# Patient Record
Sex: FEMALE | Race: WHITE | Employment: OTHER | URBAN - METROPOLITAN AREA
[De-identification: names, ages, dates, MRNs, and addresses within clinical notes are randomized per-mention and may not be internally consistent; named-entity substitution may affect disease eponyms.]

---

## 2017-01-06 ENCOUNTER — HOSPITAL ENCOUNTER (OUTPATIENT)
Dept: PHYSICAL THERAPY | Age: 72
Discharge: HOME | End: 2017-01-06
Payer: MEDICARE

## 2017-01-06 PROCEDURE — G8978 MOBILITY CURRENT STATUS: HCPCS

## 2017-01-06 PROCEDURE — G8979 MOBILITY GOAL STATUS: HCPCS

## 2017-01-06 PROCEDURE — 97162 PT EVAL MOD COMPLEX 30 MIN: CPT

## 2017-01-06 NOTE — PROGRESS NOTES
[facility] at [facility], [address]  Phone: [phone]   Fax: [phone]    Plan of Care/ Statement of Necessity for Physical Therapy Services    Patient name: Trice Page Start of Care: 2017   Referral source: Mendy Jacome MD : 1945    Medical Diagnosis: Pain in left knee [M25.562]  Pain in right knee [M25.561]   Onset Date:2016    Treatment Diagnosis: Bilateral knee pain, worse on right   Prior Hospitalization: see medical history Provider#: [id number]   Medications: Verified on Patient summary List    Comorbidities: depression, osteoporosis, arthritis, thyroid problems, high blood pressure, hearing impaired, hx breast cancer, right mastectomy, right breast reconstruction, left meniscus repair, hysterectomy, gastric bypass   Prior Level of Function: less difficulty walking and climbing stairs      The Plan of Care and the following information is based on the information from the initial evaluation. Physical Therapy Evaluation - Knee    The pt reports that she has been having bilateral knee pain, with the right knee worse, and she told her primary care doctor about it. Her doctor monitored her pain which kept getting worse so she went to see an orthopedic doctor who told her she has a torn meniscus in her right knee and told he just recommended that she wear a brace and use a cane. She injured her right knee in October when she fell when negotiating a curb and landing straight on her right knee. She went to get a second opinion and is now getting injections for her right knee. She is most likely going to have a total knee replacement in April or May. For now she has been referred for aquatic therapy to work on strengthening and pain control. The pt rates her current pain a 2/10.  She reports that her pain can be a 10/10 at worst and a 0/10 at best.  Functional limitations: prolonged standing increases her pain, cleaning her house is difficult and increases her pain, she also has pain when she goes to stand up after sitting for a while, she has pain and difficulty climbing stairs, she has difficulty walking and has had to stop walking for exrecise  The pt scored a 42 out of 100 on FOTO today. The pt is retired and lives in a two story house with her bedroom upstairs. Gait:  [] Normal    [x] Abnormal    [x] Antalgic    [] NWB    Device:    Describe: decreased stance time on right, decreased trunk rotation    ROM / Strength  [] Unable to assess                  AROM                Strength (1-5)    Right Left Right Left   Hip Flexion   4-/p! 5    Glute max  (seated)   5 5    Abduction   (seated)   4/p! 5    Adduction  (seated)   4/p! 5   Knee Flexion  (seated) 124/p! 135 4 5    Extension 2 2 4-/p! 5   Ankle Plantarflexion  (seated)   4/p! Dorsiflexion   5 5       Flexibility: [] Unable to assess at this time  Hamstrings:    (Right) Tightness= [x] WNL   [] Min   [] Mod   [] Severe    (Left) Tightness= [x] WNL   [] Min   [] Mod   [] Severe  Gastroc:      (Right) Tightness= [x] WNL   [] Min   [] Mod   [] Severe    (Left) Tightness= [x] WNL   [] Min   [] Mod   [] Severe    Palpation: the most pain/tenderness over medial joint line, slight tenderness over patellar tendon    Patella findings: mobility is WNL in all direction bilaterally, pain with superior glides on right    Assessment/ key information: 81 yo female presents with bilateral knee pain, right greater than left. She was recently diagnosed with a right knee meniscus tear and was told she has arthritis in both knees. She has such advanced arthritis in the right knee that she will most likely have a TKR in a few months. She has some LE weakness and would benefit from aquatic therapy to improve her strength and movement control to manage her pain and ability to perform ADLs until she has surgery.     Evaluation Complexity History MEDIUM  Complexity : 1-2 comorbidities / personal factors will impact the outcome/ POC ; Examination HIGH Complexity : 4+ Standardized tests and measures addressing body structure, function, activity limitation and / or participation in recreation  ;Presentation MEDIUM Complexity : Evolving with changing characteristics  ; Clinical Decision Making MEDIUM Complexity : FOTO score of 26-74  Overall Complexity Rating: MEDIUM  Problem List: pain affecting function, decrease ROM, decrease strength, impaired gait/ balance, decrease ADL/ functional abilitiies, decrease activity tolerance and decrease transfer abilities   Treatment Plan may include any combination of the following: Therapeutic exercise, Therapeutic activities, Neuromuscular re-education, Physical agent/modality, Gait/balance training, Manual therapy, Aquatic therapy, Patient education and Functional mobility training  Patient / Family readiness to learn indicated by: asking questions, trying to perform skills and interest  Persons(s) to be included in education: patient (P)  Barriers to Learning/Limitations: yes;  sensory deficits-vision/hearing/speech  Patient Goal (s): to get my leg and knee strong again  Patient Self Reported Health Status: good  Rehabilitation Potential: good    Short Term Goals: To be accomplished in 4 treatments:   1) Seated right knee flexion strength will increase to 5/5 to improve her tolerance to household cleaning. Status at eval: 4/5    Long Term Goals: To be accomplished in 8 treatments:   1) The pt will score at least a 55 on FOTO to demonstrate improved functional mobility for ADLs. Status at eval: 42     2) Right knee ext strength will increase to 4+/5 her tolerance to moving from sit to stand. Status at eval: 4-/5 with pain     3) Right hip flexion strength will increase to 4+/5 to improve her ability to climb stairs. Status at eval:4-/5 with pain    Frequency / Duration: Patient to be seen 2 times per week for 4 weeks.     Patient/ Caregiver education and instruction: Diagnosis, prognosis,    [x]  Plan of care has been reviewed with PTA    G-Codes (GP)  Mobility   Current  CK= 40-59%   Goal  CK= 40-59%    The severity rating is based on clinical judgment and the FOTO score. Certification Period: 1/6/2017 to 2/4/2017  Jorge Luis Webb, PT 1/6/2017 10:15 AM  _____________________________________________________________________  I certify that the above Therapy Services are being furnished while the patient is under my care. I agree with the treatment plan and certify that this therapy is necessary.     Physician's Signature:____________________  Date:__________Time:______    Please sign and return to   [facility] at [address] [place] [address]  Phone: [phone]   Fax: [phone]

## 2017-01-06 NOTE — PROGRESS NOTES
PT DAILY TREATMENT NOTE - [facility]     Patient Name: Suma Holland  Date:2017  : 1945  [x]  Patient  Verified  Payor: [place] MEDICARE / Plan: [place] MEDICARE PART A & B / Product Type: Medicare /    In time:10:28  Out time:10:57  Total Treatment Time (min): 29  Total Timed Codes (min): 0  1:1 Treatment Time ( [address] only): 29   Visit #: 1 of 8    Treatment Area: Pain in left knee [M25.562]  Pain in right knee [M25.561]    SUBJECTIVE  Pain Level (0-10 scale): 2/10  Any medication changes, allergies to medications, adverse drug reactions, diagnosis change, or new procedure performed?: [x] No    [] Yes (see summary sheet for update)  Subjective functional status/changes:   [] No changes reported  See eval    OBJECTIVE    Modality rationale:    Min Type Additional Details    [] Estim:  []Unatt       []IFC  []Premod                        []Other:  []w/ice   []w/heat  Position:  Location:    [] Estim: []Att    []TENS instruct  []NMES                    []Other:  []w/US   []w/ice   []w/heat  Position:  Location:    []  Traction: [] Cervical       []Lumbar                       [] Prone          []Supine                       []Intermittent   []Continuous Lbs:  [] before manual  [] after manual    []  Ultrasound: []Continuous   [] Pulsed                           []1MHz   []3MHz W/cm2:  Location:    []  Iontophoresis with dexamethasone         Location: [] Take home patch   [] In clinic    []  Ice     []  heat  []  Ice massage  []  Laser   []  Anodyne Position:  Location:    []  Laser with stim  []  Other:  Position:  Location:    []  Vasopneumatic Device Pressure:       [] lo [] med [] hi   Temperature: [] lo [] med [] hi   [] Skin assessment post-treatment:  []intact []redness- no adverse reaction    []redness  adverse reaction:     29 min [x]Eval                  []Re-Eval        min Therapeutic Exercise:  [] See flow sheet :   Rationale:      min Therapeutic Activity:  []  See flow sheet :   Rationale:       min Neuromuscular Re-education:  []  See flow sheet :   Rationale:      min Manual Therapy:     Rationale:      min Gait Training:  ___ feet with ___ device on level surfaces with ___ level of assist   Rationale: With   [] TE   [] TA   [] neuro   [] other: Patient Education: [] Review HEP    [] Progressed/Changed HEP based on:   [] positioning   [] body mechanics   [] transfers   [] heat/ice application    [] other:      Other Objective/Functional Measures: see eval     Pain Level (0-10 scale) post treatment: 5-6/10    ASSESSMENT/Changes in Function: see POC    Patient will continue to benefit from skilled PT services to modify and progress therapeutic interventions, address functional mobility deficits, address ROM deficits, address strength deficits, analyze and cue movement patterns, analyze and modify body mechanics/ergonomics, assess and modify postural abnormalities and instruct in home and community integration to attain remaining goals. [x]  See Plan of Care  []  See progress note/recertification  []  See Discharge Summary           PLAN  []  Upgrade activities as tolerated     [x]  Continue plan of care  []  Update interventions per flow sheet       []  Discharge due to:_  []  Other:_      Khadar Igancio, PT 1/6/2017  11:12 AM    No future appointments.

## 2017-01-11 ENCOUNTER — HOSPITAL ENCOUNTER (OUTPATIENT)
Dept: PHYSICAL THERAPY | Age: 72
Discharge: HOME | End: 2017-01-11
Payer: MEDICARE

## 2017-01-11 PROCEDURE — 97113 AQUATIC THERAPY/EXERCISES: CPT

## 2017-01-11 NOTE — PROGRESS NOTES
PT DAILY TREATMENT NOTE - [facility]     Patient Name: Vinnie Duncan  Date:2017  : 1945  [x]  Patient  Verified  Payor: Linder Cheadle / Plan: [place] MEDICARE PART A & B / Product Type: Medicare /    In time:1338  Out time:1408  Total Treatment Time (min): 30  Total Timed Codes (min): 30  1:1 Treatment Time ( only): 30   Visit #: 2 of 8    Treatment Area: Pain in left knee [M25.562]  Pain in right knee [M25.561]    SUBJECTIVE  Pain Level (0-10 scale): 2/10 left knee only  Any medication changes, allergies to medications, adverse drug reactions, diagnosis change, or new procedure performed?: [x] No    [] Yes (see summary sheet for update)  Subjective functional status/changes:   [] No changes reported  I got an injection in my right knee and have no pain. Reviewed eval prior to tx.   OBJECTIVE    Modality rationale:    Min Type Additional Details    [] Estim:  []Unatt       []IFC  []Premod                        []Other:  []w/ice   []w/heat  Position:  Location:    [] Estim: []Att    []TENS instruct  []NMES                    []Other:  []w/US   []w/ice   []w/heat  Position:  Location:    []  Traction: [] Cervical       []Lumbar                       [] Prone          []Supine                       []Intermittent   []Continuous Lbs:  [] before manual  [] after manual    []  Ultrasound: []Continuous   [] Pulsed                           []1MHz   []3MHz W/cm2:  Location:    []  Iontophoresis with dexamethasone         Location: [] Take home patch   [] In clinic    []  Ice     []  heat  []  Ice massage  []  Laser   []  Anodyne Position:  Location:    []  Laser with stim  []  Other:  Position:  Location:    []  Vasopneumatic Device Pressure:       [] lo [] med [] hi   Temperature: [] lo [] med [] hi   [] Skin assessment post-treatment:  []intact []redness- no adverse reaction    []redness  adverse reaction:      min []Eval                  []Re-Eval       30 min Therapeutic Exercise:  [x] See flow sheet :aquatic therex per f/s   Rationale: increase ROM and increase strength to improve the patients ability to improve ADL's     min Therapeutic Activity:  []  See flow sheet :         min Neuromuscular Re-education:  []  See flow sheet :        min Manual Therapy:          min Gait Training:  ___ feet with ___ device on level surfaces with ___ level of assist   Rationale: With   [] TE   [] TA   [] neuro   [] other: Patient Education: [x] Review HEP    [] Progressed/Changed HEP based on:   [] positioning   [] body mechanics   [] transfers   [] heat/ice application    [] other:      Other Objective/Functional Measures:      Pain Level (0-10 scale) post treatment: 3-4/10    ASSESSMENT/Changes in Function: Pt tolerated all aquatic exercises well but did report increased B knee pain when finished and out of the pool. Patient will continue to benefit from skilled PT services to modify and progress therapeutic interventions, address functional mobility deficits, address ROM deficits, address strength deficits, analyze and address soft tissue restrictions, analyze and cue movement patterns, analyze and modify body mechanics/ergonomics, assess and modify postural abnormalities, address imbalance/dizziness and instruct in home and community integration to attain remaining goals. [x]  See Plan of Care  []  See progress note/recertification  []  See Discharge Summary         Progress towards goals / Updated goals:  Short Term Goals: To be accomplished in 4 treatments:  1) Seated right knee flexion strength will increase to 5/5 to improve her tolerance to household cleaning. Status at eval: 4/5  Current: no change     Long Term Goals: To be accomplished in 8 treatments:  1) The pt will score at least a 55 on FOTO to demonstrate improved functional mobility for ADLs.   Status at eval: 42  Current: retest every 5 visits     2) Right knee ext strength will increase to 4+/5 her tolerance to moving from sit to stand.  Status at eval: 4-/5 with pain  Current: no change     3) Right hip flexion strength will increase to 4+/5 to improve her ability to climb stairs. Status at eval:4-/5 with pain  Current: no change    PLAN  []  Upgrade activities as tolerated     [x]  Continue plan of care  []  Update interventions per flow sheet       []  Discharge due to:_  [x]  Other:_assess response to first visit.       Ailyn Cid PTA 1/11/2017  3:35 PM    Future Appointments  Date Time Provider Aquilino Tirado   1/13/2017 1:30 PM Jerral Punches, PTA [facility]   1/16/2017 1:30 PM Jerral Punches, PTA [facility]   1/20/2017 1:30 PM Jerral Punches, PTA [facility]   1/23/2017 1:30 PM Jerral Punches, PTA [facility]   1/27/2017 1:30 PM Jerral Punches, PTA [facility]   1/30/2017 1:30 PM Kayode Webb, PT [facility]   2/3/2017 1:30 PM David Blanco, PT [facility]

## 2017-01-13 ENCOUNTER — APPOINTMENT (OUTPATIENT)
Dept: PHYSICAL THERAPY | Age: 72
End: 2017-01-13
Payer: MEDICARE

## 2017-01-16 ENCOUNTER — HOSPITAL ENCOUNTER (OUTPATIENT)
Dept: PHYSICAL THERAPY | Age: 72
Discharge: HOME | End: 2017-01-16
Payer: MEDICARE

## 2017-01-16 PROCEDURE — 97113 AQUATIC THERAPY/EXERCISES: CPT

## 2017-01-16 NOTE — PROGRESS NOTES
PT DAILY TREATMENT NOTE - [facility]     Patient Name: Nitin Oden  Date:2017  : 1945  [x]  Patient  Verified  Payor: [place] MEDICARE / Plan: [place] MEDICARE PART A & B / Product Type: Medicare /    In time:1:25  Out time:1:54  Total Treatment Time (min): 29  Total Timed Codes (min): 29  1:1 Treatment Time ([facility] only): 25   Visit #: 3 of 8    Treatment Area: Pain in left knee [M25.562]  Pain in right knee [M25.561]    SUBJECTIVE  Pain Level (0-10 scale): 0/10 sore  Any medication changes, allergies to medications, adverse drug reactions, diagnosis change, or new procedure performed?: [x] No    [] Yes (see summary sheet for update)  Subjective functional status/changes:   [] No changes reported  The pt reports that she was sore after her last session and thinks she over did it because she was doing more reps than she was told because she would realize she was doing the exercise incorrectly.     OBJECTIVE    Modality rationale:    Min Type Additional Details    [] Estim:  []Unatt       []IFC  []Premod                        []Other:  []w/ice   []w/heat  Position:  Location:    [] Estim: []Att    []TENS instruct  []NMES                    []Other:  []w/US   []w/ice   []w/heat  Position:  Location:    []  Traction: [] Cervical       []Lumbar                       [] Prone          []Supine                       []Intermittent   []Continuous Lbs:  [] before manual  [] after manual    []  Ultrasound: []Continuous   [] Pulsed                           []1MHz   []3MHz W/cm2:  Location:    []  Iontophoresis with dexamethasone         Location: [] Take home patch   [] In clinic    []  Ice     []  heat  []  Ice massage  []  Laser   []  Anodyne Position:  Location:    []  Laser with stim  []  Other:  Position:  Location:    []  Vasopneumatic Device Pressure:       [] lo [] med [] hi   Temperature: [] lo [] med [] hi   [] Skin assessment post-treatment:  []intact []redness- no adverse reaction    []redness  adverse reaction:      min []Eval                  []Re-Eval       29 min Therapeutic Exercise:  [x] See flow sheet : aquatics   Rationale: increase ROM and increase strength to improve the patients ability to walk     min Therapeutic Activity:  []  See flow sheet :   Rationale:       min Neuromuscular Re-education:  []  See flow sheet :   Rationale:      min Manual Therapy:     Rationale:      min Gait Training:  ___ feet with ___ device on level surfaces with ___ level of assist   Rationale: With   [] TE   [] TA   [] neuro   [] other: Patient Education: [x] Review HEP    [] Progressed/Changed HEP based on:   [] positioning   [] body mechanics   [] transfers   [] heat/ice application    [] other:      Other Objective/Functional Measures:      Pain Level (0-10 scale) post treatment: 4/10    ASSESSMENT/Changes in Function: The pt had increased pain after exercises and stated that her knee was sore from all the activity. She might not be able to be progressed for a while until she gets stronger and has better exercise tolerance. Patient will continue to benefit from skilled PT services to modify and progress therapeutic interventions, address functional mobility deficits, address ROM deficits, address strength deficits, analyze and address soft tissue restrictions, analyze and cue movement patterns, analyze and modify body mechanics/ergonomics, assess and modify postural abnormalities, address imbalance/dizziness and instruct in home and community integration to attain remaining goals.      [x]  See Plan of Care  []  See progress note/recertification  []  See Discharge Summary           PLAN  [x]  Upgrade activities as tolerated     [x]  Continue plan of care  []  Update interventions per flow sheet       []  Discharge due to:_  []  Other:_      Razia Denton, PT 1/16/2017  4:37 PM    Future Appointments  Date Time Provider Aquilino Tirado   1/20/2017 1:30 PM Dianna Mike, PTA [facility]   1/23/2017 1:30 PM Alycia Arreguin, PT [facility]   1/27/2017 1:30 PM Dianna Handler, PTA [facility]   1/30/2017 1:30 PM Alycia Arreguin, PT [facility]   2/3/2017 1:30 PM Alycia Arreguin, PT [facility]

## 2017-01-20 ENCOUNTER — HOSPITAL ENCOUNTER (OUTPATIENT)
Dept: PHYSICAL THERAPY | Age: 72
Discharge: HOME | End: 2017-01-20
Payer: MEDICARE

## 2017-01-20 PROCEDURE — 97113 AQUATIC THERAPY/EXERCISES: CPT

## 2017-01-20 NOTE — PROGRESS NOTES
PT DAILY TREATMENT NOTE - [facility]     Patient Name: Bhavya Veras  Date:2017  : 1945  [x]  Patient  Verified  Payor: Almita Riberio / Plan: [place] MEDICARE PART A & B / Product Type: Medicare /    In time:1:30  Out time:2:00  Total Treatment Time (min): 30  Total Timed Codes (min): 30  1:1 Treatment Time ( only): 25   Visit #: 4 of 8    Treatment Area: Pain in left knee [M25.562]  Pain in right knee [M25.561]    SUBJECTIVE  Pain Level (0-10 scale): 2/10  Any medication changes, allergies to medications, adverse drug reactions, diagnosis change, or new procedure performed?: [x] No    [] Yes (see summary sheet for update)  Subjective functional status/changes:   [x] No changes reported      OBJECTIVE    Modality rationale:    Min Type Additional Details    [] Estim:  []Unatt       []IFC  []Premod                        []Other:  []w/ice   []w/heat  Position:  Location:    [] Estim: []Att    []TENS instruct  []NMES                    []Other:  []w/US   []w/ice   []w/heat  Position:  Location:    []  Traction: [] Cervical       []Lumbar                       [] Prone          []Supine                       []Intermittent   []Continuous Lbs:  [] before manual  [] after manual    []  Ultrasound: []Continuous   [] Pulsed                           []1MHz   []3MHz W/cm2:  Location:    []  Iontophoresis with dexamethasone         Location: [] Take home patch   [] In clinic    []  Ice     []  heat  []  Ice massage  []  Laser   []  Anodyne Position:  Location:    []  Laser with stim  []  Other:  Position:  Location:    []  Vasopneumatic Device Pressure:       [] lo [] med [] hi   Temperature: [] lo [] med [] hi   [] Skin assessment post-treatment:  []intact []redness- no adverse reaction    []redness  adverse reaction:      min []Eval                  []Re-Eval       30 min Therapeutic Exercise:  [x] See flow sheet : aquatics   Rationale: increase ROM and increase strength to improve the patients ability to climb stairs     min Therapeutic Activity:  []  See flow sheet :   Rationale:       min Neuromuscular Re-education:  []  See flow sheet :   Rationale:      min Manual Therapy:     Rationale:      min Gait Training:  ___ feet with ___ device on level surfaces with ___ level of assist   Rationale: With   [] TE   [] TA   [] neuro   [] other: Patient Education: [x] Review HEP    [] Progressed/Changed HEP based on:   [] positioning   [] body mechanics   [] transfers   [] heat/ice application    [] other:      Other Objective/Functional Measures: see goal review     Pain Level (0-10 scale) post treatment: 0/10    ASSESSMENT/Changes in Function: The pt does well once she is in the water but is sore after her sessions due to the deterioration in her knee. She might have to cut back on some of her water exercises even to control her pain. Patient will continue to benefit from skilled PT services to modify and progress therapeutic interventions, address functional mobility deficits, address ROM deficits, address strength deficits, analyze and address soft tissue restrictions, analyze and cue movement patterns, analyze and modify body mechanics/ergonomics, assess and modify postural abnormalities, address imbalance/dizziness and instruct in home and community integration to attain remaining goals. [x]  See Plan of Care  []  See progress note/recertification  []  See Discharge Summary         Progress towards goals / Updated goals:  Short Term Goals: To be accomplished in 4 treatments:  1) Seated right knee flexion strength will increase to 5/5 to improve her tolerance to household cleaning. Status at eval: 4/5  Current: Not met      Long Term Goals: To be accomplished in 8 treatments:  1) The pt will score at least a 55 on FOTO to demonstrate improved functional mobility for ADLs.   Status at [facility]: 42  Current: retest every 5 visits      2) Right knee ext strength will increase to 4+/5 her tolerance to moving from sit to stand. Status at eval: 4-/5 with pain  Current: Not met      3) Right hip flexion strength will increase to 4+/5 to improve her ability to climb stairs.   Status at eval:4-/5 with pain  Current: Progressing    PLAN  [x]  Upgrade activities as tolerated     [x]  Continue plan of care  []  Update interventions per flow sheet       []  Discharge due to:_  []  Other:_      Jersey Christopher, PT 1/20/2017  4:11 PM    Future Appointments  Date Time Provider Aquilino Tirado   1/23/2017 1:30 PM Jersey Christopher, PT [facility]   1/27/2017 1:30 PM Igor Pena PTA [facility]   1/30/2017 1:30 PM Jersey Christopher, PT [facility]   2/3/2017 1:30 PM Igor Pena PTA [facility]

## 2017-01-23 ENCOUNTER — HOSPITAL ENCOUNTER (OUTPATIENT)
Dept: PHYSICAL THERAPY | Age: 72
Discharge: HOME | End: 2017-01-23
Payer: MEDICARE

## 2017-01-23 PROCEDURE — 97113 AQUATIC THERAPY/EXERCISES: CPT

## 2017-01-23 NOTE — PROGRESS NOTES
PT DAILY TREATMENT NOTE - [facility]     Patient Name: Zackery Elaine  Date:2017  : 1945  [x]  Patient  Verified  Payor: Nicole Stone / Plan: [place] MEDICARE PART A & B / Product Type: Medicare /    In time:1:25  Out time:2:00  Total Treatment Time (min): 35  Total Timed Codes (min): 35  1:1 Treatment Time ( [address] only): 35   Visit #: 5 of 8    Treatment Area: Pain in left knee [M25.562]  Pain in right knee [M25.561]    SUBJECTIVE  Pain Level (0-10 scale): 4/10  Any medication changes, allergies to medications, adverse drug reactions, diagnosis change, or new procedure performed?: [x] No    [] Yes (see summary sheet for update)  Subjective functional status/changes:   [] No changes reported  The pt reports that she continues to feel good in the water but her pain comes back once she gets out.     OBJECTIVE    Modality rationale:    Min Type Additional Details    [] Estim:  []Unatt       []IFC  []Premod                        []Other:  []w/ice   []w/heat  Position:  Location:    [] Estim: []Att    []TENS instruct  []NMES                    []Other:  []w/US   []w/ice   []w/heat  Position:  Location:    []  Traction: [] Cervical       []Lumbar                       [] Prone          []Supine                       []Intermittent   []Continuous Lbs:  [] before manual  [] after manual    []  Ultrasound: []Continuous   [] Pulsed                           []1MHz   []3MHz W/cm2:  Location:    []  Iontophoresis with dexamethasone         Location: [] Take home patch   [] In clinic    []  Ice     []  heat  []  Ice massage  []  Laser   []  Anodyne Position:  Location:    []  Laser with stim  []  Other:  Position:  Location:    []  Vasopneumatic Device Pressure:       [] lo [] med [] hi   Temperature: [] lo [] med [] hi   [] Skin assessment post-treatment:  []intact []redness- no adverse reaction    []redness  adverse reaction:      min []Eval                  []Re-Eval       35 min Therapeutic Exercise:  [x] See flow sheet : aquatics   Rationale: increase ROM and increase strength to improve the patients ability to climb stairs     min Therapeutic Activity:  []  See flow sheet :   Rationale:       min Neuromuscular Re-education:  []  See flow sheet :   Rationale:      min Manual Therapy:     Rationale:      min Gait Training:  ___ feet with ___ device on level surfaces with ___ level of assist   Rationale: With   [] TE   [] TA   [] neuro   [] other: Patient Education: [x] Review HEP    [] Progressed/Changed HEP based on:   [] positioning   [] body mechanics   [] transfers   [] heat/ice application    [] other:      Other Objective/Functional Measures: see goal review; pt scored a 37 on FOTO today     Pain Level (0-10 scale) post treatment: 0/10    ASSESSMENT/Changes in Function: The pt was nervous about trying deep water exercises today but wanted to try. She did well and stated that she did like the deep water movement because it felt good for her knee but continues to be nervous and need a lot of attention and support during deep water activity. Patient will continue to benefit from skilled PT services to modify and progress therapeutic interventions, address functional mobility deficits, address ROM deficits, address strength deficits, analyze and address soft tissue restrictions, analyze and cue movement patterns, analyze and modify body mechanics/ergonomics, assess and modify postural abnormalities and instruct in home and community integration to attain remaining goals. [x]  See Plan of Care  []  See progress note/recertification  []  See Discharge Summary         Progress towards goals / Updated goals:  Short Term Goals: To be accomplished in 4 treatments:  1) Seated right knee flexion strength will increase to 5/5 to improve her tolerance to household cleaning. Status at [facility]: 4/5  Current: Progressing, 4+/5      Long Term Goals:  To be accomplished in 8 treatments:  1) The pt will score at least a 55 on FOTO to demonstrate improved functional mobility for ADLs. Status at eval: 42  Current: Not met, 37      2) Right knee ext strength will increase to 4+/5 her tolerance to moving from sit to stand. Status at eval: 4-/5 with pain  Current: Progressing, 4/5 with pain      3) Right hip flexion strength will increase to 4+/5 to improve her ability to climb stairs.   Status at eval:4-/5 with pain  Current: Progressing, 4/5 with pain    PLAN  [x]  Upgrade activities as tolerated     [x]  Continue plan of care  []  Update interventions per flow sheet       []  Discharge due to:_  []  Other:_      Caroline Boyd PT 1/23/2017  3:23 PM    Future Appointments  Date Time Provider Aquilino Tirado   1/27/2017 1:30 PM MRAITA Richard [facility]   1/30/2017 1:30 PM TODD Reed [facility]   2/3/2017 1:30 PM MARITA Richard [facility]

## 2017-01-27 ENCOUNTER — HOSPITAL ENCOUNTER (OUTPATIENT)
Dept: PHYSICAL THERAPY | Age: 72
Discharge: HOME | End: 2017-01-27
Payer: MEDICARE

## 2017-01-27 PROCEDURE — 97113 AQUATIC THERAPY/EXERCISES: CPT

## 2017-01-27 NOTE — PROGRESS NOTES
PT DAILY TREATMENT NOTE - [facility]     Patient Name: Qiana Martin  Date:2017  : 1945  [x]  Patient  Verified  Payor: [place] MEDICARE / Plan: [place] MEDICARE PART A & B / Product Type: Medicare /    In time:1315  Out time:1353  Total Treatment Time (min): 38  Total Timed Codes (min): 38  1:1 Treatment Time ( [address] only): 29   Visit #: 6 of 8    Treatment Area: Pain in left knee [M25.562]  Pain in right knee [M25.561]    SUBJECTIVE  Pain Level (0-10 scale): 0/10  Any medication changes, allergies to medications, adverse drug reactions, diagnosis change, or new procedure performed?: [x] No    [] Yes (see summary sheet for update)  Subjective functional status/changes:   [] No changes reported  My knees hurt the day following PT but I want to keep working in the water.     OBJECTIVE    Modality rationale:    Min Type Additional Details    [] Estim:  []Unatt       []IFC  []Premod                        []Other:  []w/ice   []w/heat  Position:  Location:    [] Estim: []Att    []TENS instruct  []NMES                    []Other:  []w/US   []w/ice   []w/heat  Position:  Location:    []  Traction: [] Cervical       []Lumbar                       [] Prone          []Supine                       []Intermittent   []Continuous Lbs:  [] before manual  [] after manual    []  Ultrasound: []Continuous   [] Pulsed                           []1MHz   []3MHz W/cm2:  Location:    []  Iontophoresis with dexamethasone         Location: [] Take home patch   [] In clinic    []  Ice     []  heat  []  Ice massage  []  Laser   []  Anodyne Position:  Location:    []  Laser with stim  []  Other:  Position:  Location:    []  Vasopneumatic Device Pressure:       [] lo [] med [] hi   Temperature: [] lo [] med [] hi   [] Skin assessment post-treatment:  []intact []redness- no adverse reaction    []redness  adverse reaction:      min []Eval                  []Re-Eval       38 (29) min Therapeutic Exercise:  [x] See flow sheet :aquatic therex with tap downs  And 3 way LE added   Rationale: increase ROM and increase strength to improve the patients ability to improve gait and ADL's     min Therapeutic Activity:  []  See flow sheet :         min Neuromuscular Re-education:  []  See flow sheet :        min Manual Therapy:          min Gait Training:  ___ feet with ___ device on level surfaces with ___ level of assist   Rationale: With   [] TE   [] TA   [] neuro   [] other: Patient Education: [x] Review HEP    [] Progressed/Changed HEP based on:   [] positioning   [] body mechanics   [] transfers   [] heat/ice application    [] other:      Other Objective/Functional Measures:      Pain Level (0-10 scale) post treatment: 0/10    ASSESSMENT/Changes in Function: Pt reports abolished pain in the water although B knee pain returns the day following PT. Patient will continue to benefit from skilled PT services to modify and progress therapeutic interventions, address functional mobility deficits, address ROM deficits, address strength deficits, analyze and address soft tissue restrictions, analyze and cue movement patterns and analyze and modify body mechanics/ergonomics to attain remaining goals. [x]  See Plan of Care. Pt performed all shallow water therex today sec to reported fear of deep water.   []  See progress note/recertification  []  See Discharge Summary           PLAN  []  Upgrade activities as tolerated     [x]  Continue plan of care  []  Update interventions per flow sheet       []  Discharge due to:_  []  Other:_      Jai Pickard PTA 1/27/2017  2:49 PM    Future Appointments  Date Time Provider Aquilino Tirado   1/30/2017 1:30 PM Librado Lazo [facility]   2/3/2017 1:30 PM Jai Pickard PTA [id number]

## 2017-01-30 ENCOUNTER — HOSPITAL ENCOUNTER (OUTPATIENT)
Dept: PHYSICAL THERAPY | Age: 72
Discharge: HOME | End: 2017-01-30
Payer: MEDICARE

## 2017-01-30 PROCEDURE — 97113 AQUATIC THERAPY/EXERCISES: CPT

## 2017-02-03 ENCOUNTER — HOSPITAL ENCOUNTER (OUTPATIENT)
Dept: PHYSICAL THERAPY | Age: 72
Discharge: HOME | End: 2017-02-03
Payer: MEDICARE

## 2017-02-03 PROCEDURE — 97113 AQUATIC THERAPY/EXERCISES: CPT

## 2017-02-03 PROCEDURE — G8979 MOBILITY GOAL STATUS: HCPCS

## 2017-02-03 PROCEDURE — G8980 MOBILITY D/C STATUS: HCPCS

## 2017-02-03 NOTE — PROGRESS NOTES
[facility] [place] [address] [place] [address]  Phone: [phone]   Fax: [phone]    Discharge Summary    Patient name: Qiana Martin     Start of Care: 2017  Referral source: Casandra Zavala MD    : 1945  Medical/Treatment Diagnosis: Pain in left knee [M25.562]  Pain in right knee [M25.561]  Onset Date:2016  Prior Hospitalization: see medical history   Provider#: [id number]  Comorbidities: depression, osteoporosis, arthritis, thyroid problems, high blood pressure, hearing impaired, hx breast cancer, right mastectomy, right breast reconstruction, left meniscus repair, hysterectomy, gastric bypass  Prior Level of Function: less difficulty walking and climbing stairs  Medications: Verified on Patient Summary List    Visits from Start of Care: 8    Missed Visits: 1  Reporting Period : 2017 to 2/3/2017    Short Term Goals: To be accomplished in 4 treatments:  1) Seated right knee flexion strength will increase to 5/5 to improve her tolerance to household cleaning. Status at eval: 4/5  Current: Progressing, 4+/5      Long Term Goals: To be accomplished in 8 treatments:  1) The pt will score at least a 55 on FOTO to demonstrate improved functional mobility for ADLs. Status at eval: 42  Current: Not met, 37      2) Right knee ext strength will increase to 4+/5 her tolerance to moving from sit to stand. Status at eval: 4-/5 with pain  Current: Met, 4+/5 (2017)      3) Right hip flexion strength will increase to 4+/5 to improve her ability to climb stairs. Status at eval:4-/5 with pain  Current: Progressing, 4/5 with pain    G-Codes (GP)  Mobility    Goal  CK= 40-59%  D/C  CL= 60-79%    The severity rating is based on clinical judgment and the FOTO score. Assessment/ Summary of Care: The pt did well with aquatic exercises and reports that it is the best exercise for her.  Due to the condition of her joints though she doesn't get any lasting pain relief from aquatic exercises but it does give her an environment she can exercise in. At this point she has been well educated with exercises and is motivated to continue independently so she is being discharged.     RECOMMENDATIONS:  [x]Discontinue therapy: [x]Patient has reached or is progressing toward set goals      []Patient is non-compliant or has abdicated      []Due to lack of appreciable progress towards set goals    Carlos Enrique Mendez, PT 2/3/2017 4:01 PM

## 2017-02-03 NOTE — PROGRESS NOTES
PT DAILY TREATMENT NOTE - [facility]     Patient Name: Jarred Ascencio  Date:2/3/2017  : 1945  [x]  Patient  Verified  Payor: [place] MEDICARE / Plan: [place] MEDICARE PART A & B / Product Type: Medicare /    In time:1333  Out time:1405  Total Treatment Time (min): 32  Total Timed Codes (min): 32  1:1 Treatment Time ( only): 32   Visit #: 8 of 8    Treatment Area: Pain in left knee [M25.562]  Pain in right knee [M25.561]    SUBJECTIVE  Pain Level (0-10 scale): 0/10  Any medication changes, allergies to medications, adverse drug reactions, diagnosis change, or new procedure performed?: [x] No    [] Yes (see summary sheet for update)  Subjective functional status/changes:   [] No changes reported  The water has been the best thing for me.     OBJECTIVE    Modality rationale:    Min Type Additional Details    [] Estim:  []Unatt       []IFC  []Premod                        []Other:  []w/ice   []w/heat  Position:  Location:    [] Estim: []Att    []TENS instruct  []NMES                    []Other:  []w/US   []w/ice   []w/heat  Position:  Location:    []  Traction: [] Cervical       []Lumbar                       [] Prone          []Supine                       []Intermittent   []Continuous Lbs:  [] before manual  [] after manual    []  Ultrasound: []Continuous   [] Pulsed                           []1MHz   []3MHz W/cm2:  Location:    []  Iontophoresis with dexamethasone         Location: [] Take home patch   [] In clinic    []  Ice     []  heat  []  Ice massage  []  Laser   []  Anodyne Position:  Location:    []  Laser with stim  []  Other:  Position:  Location:    []  Vasopneumatic Device Pressure:       [] lo [] med [] hi   Temperature: [] lo [] med [] hi   [] Skin assessment post-treatment:  []intact []redness- no adverse reaction    []redness  adverse reaction:      min []Eval                  []Re-Eval       32 min Therapeutic Exercise:  [x] See flow sheet :   Rationale: increase ROM, increase strength and improve coordination to improve the patients ability to improve gait and ADL's     min Therapeutic Activity:  []  See flow sheet :         min Neuromuscular Re-education:  []  See flow sheet :        min Manual Therapy:          min Gait Training:  ___ feet with ___ device on level surfaces with ___ level of assist   Rationale: With   [] TE   [] TA   [] neuro   [] other: Patient Education: [x] Review HEP    [] Progressed/Changed HEP based on:   [] positioning   [] body mechanics   [] transfers   [] heat/ice application    [] other:      Other Objective/Functional Measures: Pt issued 2 week trial pass. All aquatic therex reviewed for discharge. Pain Level (0-10 scale) post treatment: 0/10    ASSESSMENT/Changes in Function: Pt is motivated to continue independently in the pool for self management of B knee pain. []  See Plan of Care  []  See progress note/recertification  [x]  See Discharge Summary           PLAN  []  Upgrade activities as tolerated     []  Continue plan of care  []  Update interventions per flow sheet       [x]  Discharge due to: progressing toward goals with pt motivated to continue in the pool independently._  []  Other:_      Betzy Purcell, PTA 2/3/2017  3:44 PM    No future appointments.

## 2017-06-05 ENCOUNTER — HOSPITAL ENCOUNTER (OUTPATIENT)
Dept: PHYSICAL THERAPY | Age: 72
Discharge: HOME | End: 2017-06-05
Payer: MEDICARE

## 2017-06-05 PROCEDURE — G8978 MOBILITY CURRENT STATUS: HCPCS

## 2017-06-05 PROCEDURE — G8979 MOBILITY GOAL STATUS: HCPCS

## 2017-06-05 PROCEDURE — 97162 PT EVAL MOD COMPLEX 30 MIN: CPT

## 2017-06-05 PROCEDURE — 97110 THERAPEUTIC EXERCISES: CPT

## 2017-06-05 NOTE — PROGRESS NOTES
PT DAILY TREATMENT NOTE - [facility]     Patient Name: Jyotsna Freedman  Date:2017  : 1945  [x]  Patient  Verified  Payor: Keli Postin / Plan: VA MEDICARE PART A & B / Product Type: Medicare /    In time:915  Out time:955  Total Treatment Time (min): 40  Total Timed Codes (min): 10  1:1 Treatment Time ( only): 40   Visit #: 1 of 12    Treatment Area: Pain in right knee [M25.561]    SUBJECTIVE  Pain Level (0-10 scale): 7/10 seated at rest  Any medication changes, allergies to medications, adverse drug reactions, diagnosis change, or new procedure performed?: [x] No    [] Yes (see summary sheet for update)  Subjective functional status/changes:   [] No changes reported  See POC    OBJECTIVE    Modality rationale:    Min Type Additional Details    [] Estim:  []Unatt       []IFC  []Premod                        []Other:  []w/ice   []w/heat  Position:  Location:    [] Estim: []Att    []TENS instruct  []NMES                    []Other:  []w/US   []w/ice   []w/heat  Position:  Location:    []  Traction: [] Cervical       []Lumbar                       [] Prone          []Supine                       []Intermittent   []Continuous Lbs:  [] before manual  [] after manual    []  Ultrasound: []Continuous   [] Pulsed                           []1MHz   []3MHz W/cm2:  Location:    []  Iontophoresis with dexamethasone         Location: [] Take home patch   [] In clinic    []  Ice     []  heat  []  Ice massage  []  Laser   []  Anodyne Position:  Location:    []  Laser with stim  []  Other:  Position:  Location:    []  Vasopneumatic Device Pressure:       [] lo [] med [] hi   Temperature: [] lo [] med [] hi   [] Skin assessment post-treatment:  []intact []redness- no adverse reaction    []redness  adverse reaction:     30 min [x]Eval                  []Re-Eval       10 min Therapeutic Exercise:  [x] See flow sheet :   Rationale: increase ROM and increase strength to improve the patients ability to normalize gait and function     min Therapeutic Activity:  []  See flow sheet :         min Neuromuscular Re-education:  []  See flow sheet :        min Manual Therapy:        min Gait Training:  ___ feet with ___ device on level surfaces with ___ level of assist   Rationale: With   [] TE   [] TA   [] neuro   [] other: Patient Education: [x] Review HEP    [] Progressed/Changed HEP based on:   [] positioning   [] body mechanics   [] transfers   [] heat/ice application    [] other:      Other Objective/Functional Measures:   Physical Therapy Evaluation - Knee  Pt s/p right TKA on 5/4/17 with reports of 3 hematomas (not blood clots) after surgery. Pt discharged home from hospital with home health services that finished end of May. Pt also c/o left knee pain with prior meniscus repair years ago.   PLOF: independent with ADL's, ambulating without AD, driving  Present Functional Limitations: standing, walking, stair negotiation, playing with great-grandchildren      Gait:  [] Normal    [] Abnormal    [x] Antalgic    [] NWB    Device: none    Describe:    ROM / Strength  [] Unable to assess                  AROM                      PROM                   Strength (1-5)    Left Right Left Right Left Right   Hip Flexion          Extension          Abduction          Adduction         Knee Flexion 130 92   5/5 4/5    Extension 0 10   5/5 4+/5   Ankle Plantarflexion          Dorsiflexion             Flexibility: [] Unable to assess at this time  Hamstrings:    (L) Tightness= [x] WNL   [x] Min   [] Mod   [] Severe  5 degrees   (R) Tightness= [] WNL   [] Min   [] Mod   [x] Severe  20 degrees  Quadriceps:    (L) Tightness= [] WNL   [] Min   [] Mod   [] Severe    (R) Tightness= [] WNL   [] Min   [] Mod   [] Severe  Gastroc:      (L) Tightness= [] WNL   [] Min   [] Mod   [] Severe    (R) Tightness= [] WNL   [] Min   [] Mod   [] Severe  Other:    Palpation:   Neg/Pos  Neg/Pos  Neg/Pos   Joint Line  Quad tendon  Patellar ligament    Patella Fibular head  Pes Anserinus    Tibial tubercle  Hamstring tendons  Infrapatellar fat pad      Optional Tests:  Patellar Positioning (Static)   []L []R Normal []L []R Lateral   []L []R [place]      []L []R Medial   []L []R [place]    Patellar Tracking   []L []R Glide (Lat)   []L []R Tilt (Lat)     []L []R Glide (Med)  []L []R Tilt (Med)      []L []R Tile (Inf)     Patellar Mobility   []L []R Hypermobile []L []R Hypomobile         Girth Measurements:     Cm at  Cm above joint line   Cm at   Cm below joint line  Cm at joint line   Left     37.5   Right      39.5     Lachmans  [] Neg    [] Pos Posterior Drawer [] Neg    [] Pos  Pivot Shift  [] Neg    [] Pos Posterior Sag  [] Neg    [] Pos  RIGO   [] Neg    [] Pos Jackson's Test [] Neg    [] Pos  ALRI   [] Neg    [] Pos Squat   [] Neg    [] Pos  Valgus@ 0 Degrees [] Neg    [] Pos Hortensia-Trey [] Neg    [] Pos  Valgus@ 30 Degrees [] Neg    [] Pos Patellar Apprehension [] Neg    [] Pos  Varus@ 0 Degrees [] Neg    [] Pos Castillo's Compression [] Neg    [] Pos  Varus@ 30 Degrees [] Neg    [] Pos Ely's Test  [] Neg    [] Pos  Apley's Compression [] Neg    [] Pos Aleksandr's Test  [] Neg    [] Pos  Apley's Distraction [] Neg    [] Pos Stroke Test  [] Neg    [] Pos   Anterior Drawer [] Neg    [] Pos Fluctuation Test [] Neg    [] Pos  Other:                  [] Neg    [] Pos                 Other tests/comments:  Pt able to negotiate stairs with reciprocating pattern with HR with supervision. Pain Level (0-10 scale) post treatment: 5/10    ASSESSMENT/Changes in Function: see POC    Patient will continue to benefit from skilled PT services to modify and progress therapeutic interventions, address functional mobility deficits, address ROM deficits, address strength deficits, analyze and address soft tissue restrictions, analyze and cue movement patterns and assess and modify postural abnormalities to attain remaining goals.      [x]  See Plan of Care  []  See progress note/recertification  []  See Discharge Summary         Progress towards goals / Updated goals:  See POC    PLAN  [x]  Upgrade activities as tolerated     [x]  Continue plan of care  []  Update interventions per flow sheet       []  Discharge due to:_  [x]  Other: ROM/stretching, strengthening/stability, mod prn, gait, aquatic PT 1x/wk once cleared by MD Rosalva Armando, PT 6/5/2017  9:00 AM    No future appointments.

## 2017-06-05 NOTE — PROGRESS NOTES
[facility] at [address] [place] [address]  Phone: [phone]   Fax: [phone]    Plan of Care/ Statement of Necessity for Physical Therapy Services    Patient name: Fany Niño Start of Care: 2017   Referral source: Yaneli Sterling MD : 1945    Medical Diagnosis: Pain in right knee [M25.561]   Onset Date:17 (DOS)    Treatment Diagnosis: right knee pain s/p TKA   Prior Hospitalization: see medical history Provider#: [id number]   Medications: Verified on Patient summary List    Comorbidities: depression, osteoporosis, arthritis, thyroid problems, high blood pressure, hearing impaired, hx breast cancer, right mastectomy, right breast reconstruction, left meniscus repair, hysterectomy, gastric bypass   Prior Level of Function: independent with ADL's, ambulating without AD, driving      The Plan of Care and following information is based on the information from the initial evaluation. Assessment/ key information: Pt is a 72 yo female presenting to clinic s/p right knee surgery as describe above with reports of 3 hematomas (not blood clots) in calf following surgery. Pt also c/o left knee pain with prior meniscus repair years ago for which she also received PT here in this clinic earlier this year. On exam, pt ambulating without AD with antalgic gait pattern. She has decrease right knee AROM, decrease right knee strength, significant HS tightness bilaterally, decreased activity tolerance and decreased functional abilities secondary to post op status. Evaluation Complexity History MEDIUM  Complexity : 1-2 comorbidities / personal factors will impact the outcome/ POC ; Examination MEDIUM Complexity : 3 Standardized tests and measures addressing body structure, function, activity limitation and / or participation in recreation  ;Presentation MEDIUM Complexity : Evolving with changing characteristics  ; Clinical Decision Making MEDIUM Complexity : FOTO score of 26-74  Overall Complexity Rating: MEDIUM  Problem List: pain affecting function, decrease ROM, decrease strength, edema affecting function, impaired gait/ balance, decrease ADL/ functional abilitiies, decrease activity tolerance and decrease flexibility/ joint mobility   Treatment Plan may include any combination of the following: Therapeutic exercise, Therapeutic activities, Neuromuscular re-education, Physical agent/modality, Gait/balance training, Manual therapy, Aquatic therapy and Patient education  Patient / Family readiness to learn indicated by: asking questions, trying to perform skills and interest  Persons(s) to be included in education: patient (P) and family support person (FSP);list pt's   Barriers to Learning/Limitations: None  Patient Goal (s): ROM and mobility  Patient Self Reported Health Status: excellent  Rehabilitation Potential: good    Short Term Goals: To be accomplished in 2 weeks:  1. Patient will be independent and compliant with HEP to achieve other goals. Status at eval: not independent/compliant  2. Increase right knee AROM ext to 0 to normalize gait. Status at eval: 10 degrees  3. Increase right knee AROM flex >/= 105 to normalize ADL's. Status at eval: 90 degrees    Long Term Goals: To be accomplished in 4 weeks:  1. Improve FOTO score to >/= 66/100 to indicate decreased pain with ADL's. Status at eval: 56/100  2. Increase right knee AROM flex >/= 120 to normalize ADL's. Status at eval: 90 degrees\  3. Increase right knee flex strength to 5/5 to normalize function. Status at eval: 4/5  4. Increase right HS flexibility by >/= 10 degrees to normalize function.   Status at eval: 20 degrees    Frequency / Duration: Patient to be seen 3 times per week for 4 weeks. (pt may do aquatic PT for 1 of the 3x/wk, once cleared by MD)    Patient/ Caregiver education and instruction: Diagnosis, prognosis, exercises and other ice application   [x]  Plan of care has been reviewed with PTA    G-Codes (GP)  Mobility  [id number] Current  CK= 40-59%   Goal  CJ= 20-39%    The severity rating is based on clinical judgment and the FOTO score. Certification Period: 6/5/17 - 8/3/17  Massiel Gaspar, PT 6/5/2017 10:17 AM  _____________________________________________________________________  I certify that the above Therapy Services are being furnished while the patient is under my care. I agree with the treatment plan and certify that this therapy is necessary.     Physician's Signature:____________________  Date:__________Time:______    Please sign and return to   [facility] at [address] [place] [address]  Phone: [phone]   Fax: [phone]

## 2017-06-09 ENCOUNTER — APPOINTMENT (OUTPATIENT)
Dept: PHYSICAL THERAPY | Age: 72
End: 2017-06-09
Payer: MEDICARE

## 2017-06-12 ENCOUNTER — HOSPITAL ENCOUNTER (OUTPATIENT)
Dept: PHYSICAL THERAPY | Age: 72
Discharge: HOME | End: 2017-06-12
Payer: MEDICARE

## 2017-06-12 PROCEDURE — 97110 THERAPEUTIC EXERCISES: CPT

## 2017-06-12 PROCEDURE — 97140 MANUAL THERAPY 1/> REGIONS: CPT

## 2017-06-12 NOTE — PROGRESS NOTES
PT DAILY TREATMENT NOTE - [facility]     Patient Name: Gayathri Guadarrama  Date:2017  : 1945  [x]  Patient  Verified  Payor: Delisa Bars / Plan: [place] MED[place] PART A & B / Product Type: Medicare /    In time:435  Out time:513  Total Treatment Time (min): 38  Total Timed Codes (min): 38  1:1 Treatment Time ( [address] only): 23   Visit #: 2 of 12    Treatment Area: Pain in right knee [M25.561]    SUBJECTIVE  Pain Level (0-10 scale): 5  Any medication changes, allergies to medications, adverse drug reactions, diagnosis change, or new procedure performed?: [x] No    [] Yes (see summary sheet for update)  Subjective functional status/changes:   [] No changes reported  \"I just came from the doctor, he said I can go back in the pool. He could get me to 105 deg. \"    OBJECTIVE    30 min Therapeutic Exercise:  [x] See flow sheet :   Rationale: increase ROM, increase strength, improve coordination, improve balance and increase proprioception to improve the patients ability to perform ADL's with reduced pain levels. 8 min Manual Therapy:  STM to HS and gastroc, patellar mobs, PROM in to knee flexion   Rationale: decrease pain, increase ROM, increase tissue extensibility, decrease trigger points and increase postural awareness to perform ADL's with reduced pain levels. With   [] TE   [] TA   [] neuro   [] other: Patient Education: [x] Review HEP    [] Progressed/Changed HEP based on:   [] positioning   [] body mechanics   [] transfers   [] heat/ice application    [] other:      Other Objective/Functional Measures: knee flexion ROM to 104     Pain Level (0-10 scale) post treatment: 8    ASSESSMENT/Changes in Function: Tolerated session well. Very challenged with HS stretch- unable to perform for full 30 sec. Pt expressed interest in aquatic therapy and reported MD cleared but no script. Explained limited availability of aquatics and need for PT script- pt verbalized understanding.      Patient will continue to benefit from skilled PT services to modify and progress therapeutic interventions, address functional mobility deficits, address ROM deficits, address strength deficits, analyze and address soft tissue restrictions, analyze and cue movement patterns, analyze and modify body mechanics/ergonomics, assess and modify postural abnormalities and instruct in home and community integration to attain remaining goals. []  See Plan of Care  []  See progress note/recertification  []  See Discharge Summary         Progress towards goals / Updated goals:  Short Term Goals: To be accomplished in 2 weeks:  1. Patient will be independent and compliant with HEP to achieve other goals. Status at eval: not independent/compliant  Current: reports compliance    2. Increase right knee AROM ext to 0 to normalize gait. Status at eval: 10 degrees  Current: NT    3. Increase right knee AROM flex >/= 105 to normalize ADL's. Status at eval: 90 degrees  Current:     Long Term Goals: To be accomplished in 4 weeks:  1. Improve FOTO score to >/= 66/100 to indicate decreased pain with ADL's. Status at eval: 56/100  2. Increase right knee AROM flex >/= 120 to normalize ADL's. Status at eval: 90 degrees\  3. Increase right knee flex strength to 5/5 to normalize function. Status at eval: 4/5  4. Increase right HS flexibility by >/= 10 degrees to normalize function.   Status at eval: 20 degrees       PLAN  []  Upgrade activities as tolerated     []  Continue plan of care  []  Update interventions per flow sheet       []  Discharge due to:_  []  Other:_      Sienna Brownlee PT 6/12/2017  4:51 PM    Future Appointments  Date Time Provider Aquilino Tirado   6/14/2017 9:30 AM TODD Johnson [facility]   6/16/2017 9:00 AM MARITA Paige THE [facility]   6/19/2017 1:30 PM TODD Johnson THE [facility]   6/21/2017 9:00 AM TODD Johnson THE [facility]   6/23/2017 8:30 AM MARITA Paige THE [facility]   6/26/2017 1:30 PM Zachery Burciaga Jass Castillo THE [facility]   6/28/2017 1:30 PM Tommy Dobbins, PT [facility]   6/30/2017 1:30 PM Camilo THE [facility]

## 2017-06-14 ENCOUNTER — HOSPITAL ENCOUNTER (OUTPATIENT)
Dept: PHYSICAL THERAPY | Age: 72
Discharge: HOME | End: 2017-06-14
Payer: MEDICARE

## 2017-06-14 PROCEDURE — 97140 MANUAL THERAPY 1/> REGIONS: CPT

## 2017-06-14 PROCEDURE — 97110 THERAPEUTIC EXERCISES: CPT

## 2017-06-14 NOTE — PROGRESS NOTES
PT DAILY TREATMENT NOTE - [facility]     Patient Name: Davi   Date:2017  : 1945  [x]  Patient  Verified  Payor: Rhea Dubose / Plan: [place] MED[place]ARE PART A & B / Product Type: Medicare /    In time:934  Out time:1015  Total Treatment Time (min): 41  Total Timed Codes (min): 41  1:1 Treatment Time ( only): 25   Visit #: 3 of 12    Treatment Area: Pain in right knee [M25.561]    SUBJECTIVE  Pain Level (0-10 scale): 0  Any medication changes, allergies to medications, adverse drug reactions, diagnosis change, or new procedure performed?: [x] No    [] Yes (see summary sheet for update)  Subjective functional status/changes:   [] No changes reported  \"I took a pain pill a half hour before I came, so I do not have pain. \"    OBJECTIVE  31 min Therapeutic Exercise: [x] See flow sheet :   Rationale: increase ROM, increase strength, improve coordination, improve balance and increase proprioception to improve the patients ability to perform ADL's with reduced pain levels.      10 min Manual Therapy: STM to HS and gastroc, patellar mobs, PROM in to knee flexion   Rationale: decrease pain, increase ROM, increase tissue extensibility, decrease trigger points and increase postural awareness to perform ADL's with reduced pain levels. With   [] TE   [] TA   [] neuro   [] other: Patient Education: [x] Review HEP    [] Progressed/Changed HEP based on:   [] positioning   [] body mechanics   [] transfers   [] heat/ice application    [] other:      Other Objective/Functional Measures:      Pain Level (0-10 scale) post treatment: 0    ASSESSMENT/Changes in Function: Pt performed well in therapy. Required cuing to perform squats with good form. Added gastroc stretch and reported improved tolerance to HS stretch today.     Patient will continue to benefit from skilled PT services to modify and progress therapeutic interventions, address functional mobility deficits, address ROM deficits, address strength deficits, analyze and address soft tissue restrictions, analyze and cue movement patterns, analyze and modify body mechanics/ergonomics, assess and modify postural abnormalities and instruct in home and community integration to attain remaining goals. []  See Plan of Care  []  See progress note/recertification  []  See Discharge Summary         Progress towards goals / Updated goals:  Short Term Goals: To be accomplished in 2 weeks:  1. Patient will be independent and compliant with HEP to achieve other goals. Status at eval: not independent/compliant  Current: reports compliance     2. Increase right knee AROM ext to 0 to normalize gait. Status at eval: 10 degrees  Current: NT     3. Increase right knee AROM flex >/= 105 to normalize ADL's. Status at eval: 90 degrees  Current: 105 PROM      Long Term Goals: To be accomplished in 4 weeks:  1. Improve FOTO score to >/= 66/100 to indicate decreased pain with ADL's. Status at eval: 56/100  2. Increase right knee AROM flex >/= 120 to normalize ADL's. Status at eval: 90 degrees\  3. Increase right knee flex strength to 5/5 to normalize function. Status at eval: 4/5  4. Increase right HS flexibility by >/= 10 degrees to normalize function.   Status at eval: 20 degrees    PLAN  [x]  Upgrade activities as tolerated     []  Continue plan of care  []  Update interventions per flow sheet       []  Discharge due to:_  []  Other:_      Yahaira Mcelroy PT 6/14/2017  9:37 AM    Future Appointments  Date Time Provider Aquilino Tirado   6/16/2017 9:00 AM MARITA Quinn THE [facility]   6/19/2017 1:30 PM TODD Shelton THE [facility]   6/21/2017 9:00 AM TODD Shelton THE [facility]   6/23/2017 8:30 AM MARITA Quinn THE [facility]   6/26/2017 1:30 PM MARITA RichardsonHPSALEEM THE [facility]   6/28/2017 1:30 PM TODD Shelton THE [facility]   6/30/2017 1:30 PM Alpha Yael ALEXEI [facility]

## 2017-06-16 ENCOUNTER — HOSPITAL ENCOUNTER (OUTPATIENT)
Dept: PHYSICAL THERAPY | Age: 72
Discharge: HOME | End: 2017-06-16
Payer: MEDICARE

## 2017-06-16 NOTE — PROGRESS NOTES
PT DAILY TREATMENT NOTE - [facility]     Patient Name: José Luis Senior  Date:2017  : 1945  [x]  Patient  Verified  Payor: Vern Jiang / Plan: [place] MEDICARE PART A & B / Product Type: Medicare /    In time:0900  Out time:0950  Total Treatment Time (min): 50  Total Timed Codes (min): 40  1:1 Treatment Time ( [address] only): 40   Visit #: 4 of 12    Treatment Area: Pain in right knee [M25.561]    SUBJECTIVE  Pain Level (0-10 scale): 6/10  Any medication changes, allergies to medications, adverse drug reactions, diagnosis change, or new procedure performed?: [x] No    [] Yes (see summary sheet for update)  Subjective functional status/changes:   [] No changes reported  I want to get back in the pool with you!     OBJECTIVE    Modality rationale: decrease edema, decrease inflammation and decrease pain to improve the patients ability to improve gait and function   Min Type Additional Details    [] Estim:  []Unatt       []IFC  []Premod                        []Other:  []w/ice   []w/heat  Position:  Location:    [] Estim: []Att    []TENS instruct  []NMES                    []Other:  []w/US   []w/ice   []w/heat  Position:  Location:    []  Traction: [] Cervical       []Lumbar                       [] Prone          []Supine                       []Intermittent   []Continuous Lbs:  [] before manual  [] after manual    []  Ultrasound: []Continuous   [] Pulsed                           []1MHz   []3MHz W/cm2:  Location:    []  Iontophoresis with dexamethasone         Location: [] Take home patch   [] In clinic    []  Ice     []  heat  []  Ice massage  []  Laser   []  Anodyne Position:  Location:    []  Laser with stim  []  Other:  Position:  Location:   10 [x]  Vasopneumatic Device right knee Pressure:       [] lo [x] med [] hi   Temperature: [x] lo [] med [] hi   [x] Skin assessment post-treatment:  [x]intact []redness- no adverse reaction    []redness  adverse reaction:      min []Eval                  []Re-Eval       40 min Therapeutic Exercise:  [x] See flow sheet :   Rationale: increase ROM, increase strength, improve coordination, improve balance and increase proprioception to improve the patients ability to improve gait and ADL's     min Therapeutic Activity:  []  See flow sheet :         min Neuromuscular Re-education:  []  See flow sheet :        min Manual Therapy:          min Gait Training:  ___ feet with ___ device on level surfaces with ___ level of assist   Rationale: With   [] TE   [] TA   [] neuro   [] other: Patient Education: [x] Review HEP    [] Progressed/Changed HEP based on:   [] positioning   [] body mechanics   [] transfers   [] heat/ice application    [x] other:scar massage      Other Objective/Functional Measures: Right knee AROM/PROM: 100/102 - 3/0     Pain Level (0-10 scale) post treatment: 0/10    ASSESSMENT/Changes in Function: Pt ambulating without AD with minimal pain and edema at right knee. Patient will continue to benefit from skilled PT services to modify and progress therapeutic interventions, address functional mobility deficits, address ROM deficits, address strength deficits, analyze and address soft tissue restrictions, analyze and cue movement patterns, analyze and modify body mechanics/ergonomics and instruct in home and community integration to attain remaining goals. [x]  See Plan of Care  []  See progress note/recertification  []  See Discharge Summary         Progress towards goals / Updated goals:  Short Term Goals: To be accomplished in 2 weeks:  1. Patient will be independent and compliant with HEP to achieve other goals. Status at eval: not independent/compliant  Current: reports compliance      2. Increase right knee AROM ext to 0 to normalize gait. Status at eval: 10 degrees  Current: 3/0      3. Increase right knee AROM flex >/= 105 to normalize ADL's. Status at eval: 90 degrees  Current: 100/102      Long Term Goals: To be accomplished in 4 weeks:  1.  Improve FOTO score to >/= 66/100 to indicate decreased pain with ADL's. Status at eval: 56/100  Current: retest next visit  2. Increase right knee AROM flex >/= 120 to normalize ADL's. Status at eval: 90 degrees  Current: 100/102  3. Increase right knee flex strength to 5/5 to normalize function. Status at eval: 4/5  Current: progressing  4. Increase right HS flexibility by >/= 10 degrees to normalize function. Status at eval: 20 degrees  Current:no change       PLAN  []  Upgrade activities as tolerated     [x]  Continue plan of care  []  Update interventions per flow sheet       []  Discharge due to:_  [x]  Other:_Retest FOTO next visit.       Joao Harris PTA 6/16/2017  9:31 AM    Future Appointments  Date Time Provider Aquilino Tirado   6/19/2017 1:30 PM TODD Shelton [facility]   6/21/2017 9:00 AM TODD Shelton [facility]   6/23/2017 11:00 AM TODD Barnes [facility]   6/26/2017 1:30 PM MARITA Richardson THE [facility]   6/28/2017 1:30 PM TODD Shelton [facility]   6/30/2017 11:00 AM TODD Barnes THE [facility]

## 2017-06-19 ENCOUNTER — HOSPITAL ENCOUNTER (OUTPATIENT)
Dept: PHYSICAL THERAPY | Age: 72
Discharge: HOME | End: 2017-06-19
Payer: MEDICARE

## 2017-06-19 PROCEDURE — 97016 VASOPNEUMATIC DEVICE THERAPY: CPT

## 2017-06-19 PROCEDURE — 97530 THERAPEUTIC ACTIVITIES: CPT

## 2017-06-19 PROCEDURE — 97110 THERAPEUTIC EXERCISES: CPT

## 2017-06-19 PROCEDURE — 97140 MANUAL THERAPY 1/> REGIONS: CPT

## 2017-06-19 NOTE — PROGRESS NOTES
PT DAILY TREATMENT NOTE - [facility]     Patient Name: Gayathri Guadarrama  Date:2017  : 1945  [x]  Patient  Verified  Payor: Delisa Bars / Plan: [place] MEDICARE PART A & B / Product Type: Medicare /    In time:130  Out time:226  Total Treatment Time (min): 56  Total Timed Codes (min): 46  1:1 Treatment Time ( [address] only): 41   Visit #: 5 of 12    Treatment Area: Pain in right knee [M25.561]    SUBJECTIVE  Pain Level (0-10 scale): 0  Any medication changes, allergies to medications, adverse drug reactions, diagnosis change, or new procedure performed?: [x] No    [] Yes (see summary sheet for update)  Subjective functional status/changes:   [] No changes reported  \"I have been in a meeting so they feel okay. \"    OBJECTIVE    Modality rationale: decrease pain and increase tissue extensibility to improve the patients ability to perform ADL's with reduced pain levels.     Min Type Additional Details    [] Estim:  []Unatt       []IFC  []Premod                        []Other:  []w/ice   []w/heat  Position:  Location:    [] Estim: []Att    []TENS instruct  []NMES                    []Other:  []w/US   []w/ice   []w/heat  Position:  Location:    []  Traction: [] Cervical       []Lumbar                       [] Prone          []Supine                       []Intermittent   []Continuous Lbs:  [] before manual  [] after manual    []  Ultrasound: []Continuous   [] Pulsed                           []1MHz   []3MHz W/cm2:  Location:    []  Iontophoresis with dexamethasone         Location: [] Take home patch   [] In clinic    []  Ice     []  heat  []  Ice massage  []  Laser   []  Anodyne Position:  Location:    []  Laser with stim  []  Other:  Position:  Location:   10 [x]  Vasopneumatic Device Pressure:       [] lo [] med [] hi   Temperature: [] lo [] med [] hi   [x] Skin assessment post-treatment:  [x]intact []redness- no adverse reaction    []redness  adverse reaction:     28 min Therapeutic Exercise:  [x] See flow sheet : Rationale: increase ROM, increase strength, improve coordination, improve balance and increase proprioception to improve the patients ability to perform ADL's with reduced pain levels. 10 min Therapeutic Activity:  [x]  See flow sheet : FOTO    Rationale: increase ROM, increase strength, improve coordination, improve balance and increase proprioception  to improve the patients ability to perform ADL's with reduced pain levels. 8 min Manual Therapy:  STM to genu articularis, patellar mobs in all directions, PROM of knee flexion   Rationale: decrease pain, increase ROM, increase tissue extensibility, decrease trigger points and increase postural awareness to perform ADL's with reduced pain levels. With   [] TE   [] TA   [] neuro   [] other: Patient Education: [x] Review HEP    [] Progressed/Changed HEP based on:   [] positioning   [] body mechanics   [] transfers   [] heat/ice application    [] other:      Other Objective/Functional Measures: 107 PROM, 102 AEOM     Pain Level (0-10 scale) post treatment: 0    ASSESSMENT/Changes in Function: Pt tolerated session well. Required cuing on squats. Pt reports cont pain with heel slide with strap and questions progress, obtained AROM and PROM measure and discussed improvements with pt. Patient will continue to benefit from skilled PT services to modify and progress therapeutic interventions, address functional mobility deficits, address ROM deficits, address strength deficits, analyze and address soft tissue restrictions, analyze and cue movement patterns, analyze and modify body mechanics/ergonomics, assess and modify postural abnormalities and instruct in home and community integration to attain remaining goals. []  See Plan of Care  []  See progress note/recertification  []  See Discharge Summary         Progress towards goals / Updated goals:  Short Term Goals: To be accomplished in 2 weeks:  1.  Patient will be independent and compliant with HEP to achieve other goals. Status at eval: not independent/compliant  Current: reports compliance      2. Increase right knee AROM ext to 0 to normalize gait. Status at eval: 10 degrees  Current: 3/0      3. Increase right knee AROM flex >/= 105 to normalize ADL's. Status at eval: 90 degrees  Current: 107 PROM, 102 AROM      Long Term Goals: To be accomplished in 4 weeks:  1. Improve FOTO score to >/= 66/100 to indicate decreased pain with ADL's. Status at eval: 56/100  Current: 64--progressing  2. Increase right knee AROM flex >/= 120 to normalize ADL's. Status at eval: 90 degrees  Current: 100/102  3. Increase right knee flex strength to 5/5 to normalize function. Status at eval: 4/5  Current: progressing  4. Increase right HS flexibility by >/= 10 degrees to normalize function.   Status at eval: 20 degrees  Current:no change    PLAN  [x]  Upgrade activities as tolerated     [x]  Continue plan of care  []  Update interventions per flow sheet       []  Discharge due to:_  []  Other:_      Bebeto Lawson PT 6/19/2017  1:35 PM    Future Appointments  Date Time Provider Aquilino Tirado   6/21/2017 9:00 AM TODD KesslerTOLIVE [facility]   6/23/2017 9:30 AM TODD StevensonHPTOLIVE [facility]   6/26/2017 1:30 PM MARITA DegrootHPSALEEM THE [facility]   6/28/2017 1:30 PM TODD KesslerHPTOLIVE [facility]   6/30/2017 9:30 AM TODD StevensonHPTOLIVE [facility]

## 2017-06-23 ENCOUNTER — HOSPITAL ENCOUNTER (OUTPATIENT)
Dept: PHYSICAL THERAPY | Age: 72
Discharge: HOME | End: 2017-06-23
Payer: MEDICARE

## 2017-06-23 PROCEDURE — 97113 AQUATIC THERAPY/EXERCISES: CPT

## 2017-06-26 ENCOUNTER — HOSPITAL ENCOUNTER (OUTPATIENT)
Dept: PHYSICAL THERAPY | Age: 72
Discharge: HOME | End: 2017-06-26
Payer: MEDICARE

## 2017-06-26 NOTE — PROGRESS NOTES
PT DAILY TREATMENT NOTE - [facility]     Patient Name: Ge Thompson  Date:2017  : 1945  [x]  Patient  Verified  Payor: Deborah Key / Plan: [place] / Product Type: Medicare /    In time:125  Out time:213  Total Treatment Time (min): 48  Total Timed Codes (min): 35  1:1 Treatment Time ( [address] only): 35   Visit #: 7 of 12    Treatment Area: Pain in right knee [M25.561]    SUBJECTIVE  Pain Level (0-10 scale): 10  Any medication changes, allergies to medications, adverse drug reactions, diagnosis change, or new procedure performed?: [x] No    [] Yes (see summary sheet for update)  Subjective functional status/changes:   [] No changes reported  Pt reports that she sat in a chair for the past hour and now her knee is cranky. OBJECTIVE          13  1:1  0 min min Therapeutic Exercise:  [] See flow sheet :   Rationale: increase ROM and increase strength to improve the patients ability to functional mobility       10 min Neuromuscular Re-education:  [x]  See flow sheet :   Rationale: increase ROM, increase strength, improve coordination and improve balance  to improve the patients ability to normal gait sequence    15 min Manual Therapy:  MFR to right anterior/posterior knee, distal knee, medial hamstring at insertion as well as origin of peroneal longus   Rationale: decrease pain, increase ROM, increase tissue extensibility and decrease trigger points to improve functional mobility          With   [] TE   [] TA   [x] neuro   [] other: Patient Education: [x] Review HEP    [] Progressed/Changed HEP based on:   [x] positioning   [x] body mechanics   [x] transfers   [] heat/ice application    [] other:      Other Objective/Functional Measures: 2-107 AROM  Pain Level (0-10 scale) post treatment: 8/10    ASSESSMENT/Changes in Function: Pt demonstrates mild improvements with AROM. She demonstrates fascial restrictions through right peroneal longus as well as medial hamstring contrbuting to pain. Pt does have limited tolerance to gentle manual work as well as stretching which may limit or slow improvements. Patient will continue to benefit from skilled PT services to address functional mobility deficits, address ROM deficits, address strength deficits, analyze and address soft tissue restrictions, analyze and cue movement patterns, analyze and modify body mechanics/ergonomics, assess and modify postural abnormalities and instruct in home and community integration to attain remaining goals. []  See Plan of Care  []  See progress note/recertification  []  See Discharge Summary         Progress towards goals / Updated goals:  Short Term Goals: To be accomplished in 2 weeks:  1. Patient will be independent and compliant with HEP to achieve other goals. Status at eval: not independent/compliant  Current: reports compliance      2. Increase right knee AROM ext to 0 to normalize gait. Status at eval: 10 degrees  Current: 2 degrees       3. Increase right knee AROM flex >/= 105 to normalize ADL's. Status at eval: 90 degrees  Current: 110 PROM      Long Term Goals: To be accomplished in 4 weeks:  1. Improve FOTO score to >/= 66/100 to indicate decreased pain with ADL's. Status at eval: 56/100  Current: 64--progressing  2. Increase right knee AROM flex >/= 120 to normalize ADL's. Status at eval: 90 degrees  Current: 107  3. Increase right knee flex strength to 5/5 to normalize function. Status at eval: 4/5  Current: progressing  4. Increase right HS flexibility by >/= 10 degrees to normalize function.   Status at eval: 20 degrees  Current:not assessed    PLAN  []  Upgrade activities as tolerated     [x]  Continue plan of care  []  Update interventions per flow sheet       []  Discharge due to:_  []  Other:_      Ellis Reilly PTA 6/26/2017  3:38 PM    Future Appointments  Date Time Provider Aquilino Tirado   6/28/2017 1:30 PM Elsworth Boast [facility]   6/30/2017 9:30 AM Brian Weber, PT [facility]

## 2017-06-28 ENCOUNTER — HOSPITAL ENCOUNTER (OUTPATIENT)
Dept: PHYSICAL THERAPY | Age: 72
Discharge: HOME | End: 2017-06-28
Payer: MEDICARE

## 2017-06-28 ENCOUNTER — APPOINTMENT (OUTPATIENT)
Dept: PHYSICAL THERAPY | Age: 72
End: 2017-06-28
Payer: MEDICARE

## 2017-06-28 PROCEDURE — 97110 THERAPEUTIC EXERCISES: CPT

## 2017-06-28 NOTE — PROGRESS NOTES
PT DAILY TREATMENT NOTE - [facility]     Patient Name: Winifred Dickens  Date:2017  : 1945  [x]  Patient  Verified  Payor: Dottie Staff / Plan: [place] MED[place] / Product Type: Medicare /    In time:135  Out time:209  Total Treatment Time (min): 34  Total Timed Codes (min): 34  1:1 Treatment Time ( [address] only): 30   Visit #: 8 of 12    Treatment Area: Pain in right knee [M25.561]    SUBJECTIVE  Pain Level (0-10 scale): 4  Any medication changes, allergies to medications, adverse drug reactions, diagnosis change, or new procedure performed?: [x] No    [] Yes (see summary sheet for update)  Subjective functional status/changes:   [] No changes reported  \"It is feeling better. I am a bit of an emotional wreck right now. \"    OBJECTIVE    34 min Therapeutic Exercise:  [x] See flow sheet :   Rationale: increase ROM, increase strength, improve coordination, improve balance and increase proprioception to improve the patients ability to perform ADL's with reduced pain levels. With   [] TE   [] TA   [] neuro   [] other: Patient Education: [x] Review HEP    [] Progressed/Changed HEP based on:   [] positioning   [] body mechanics   [] transfers   [] heat/ice application    [] other:      Other Objective/Functional Measures:      Pain Level (0-10 scale) post treatment: 5.5    ASSESSMENT/Changes in Function: Held manual due to pt's response last visit. Fair tolerance to session, reduce reps of each exercise due to pain. Patient will continue to benefit from skilled PT services to modify and progress therapeutic interventions, address functional mobility deficits, address ROM deficits, address strength deficits, analyze and address soft tissue restrictions, analyze and cue movement patterns, analyze and modify body mechanics/ergonomics, assess and modify postural abnormalities and instruct in home and community integration to attain remaining goals.      []  See Plan of Care  []  See progress note/recertification  []  See Discharge Summary         Progress towards goals / Updated goals:  Short Term Goals: To be accomplished in 2 weeks:  1. Patient will be independent and compliant with HEP to achieve other goals. Status at eval: not independent/compliant  Current: reports compliance      2. Increase right knee AROM ext to 0 to normalize gait. Status at eval: 10 degrees  Current: 2 degrees       3. Increase right knee AROM flex >/= 105 to normalize ADL's. Status at eval: 90 degrees  Current: 110 PROM      Long Term Goals: To be accomplished in 4 weeks:  1. Improve FOTO score to >/= 66/100 to indicate decreased pain with ADL's. Status at eval: 56/100  Current: 64--progressing  2. Increase right knee AROM flex >/= 120 to normalize ADL's. Status at eval: 90 degrees  Current: 107  3. Increase right knee flex strength to 5/5 to normalize function. Status at eval: 4/5  Current: progressing  4. Increase right HS flexibility by >/= 10 degrees to normalize function.   Status at eval: 20 degrees  Current:not assessed    PLAN  [x]  Upgrade activities as tolerated     []  Continue plan of care  []  Update interventions per flow sheet       []  Discharge due to:_  []  Other:_      Victorian Ricardo, PT 6/28/2017  1:34 PM    Future Appointments  Date Time Provider Aquilino Tirado   6/30/2017 9:30 AM Karen Fields, PT [facility]

## 2017-06-30 ENCOUNTER — HOSPITAL ENCOUNTER (OUTPATIENT)
Dept: PHYSICAL THERAPY | Age: 72
End: 2017-06-30
Payer: MEDICARE

## 2017-07-05 ENCOUNTER — APPOINTMENT (OUTPATIENT)
Dept: PHYSICAL THERAPY | Age: 72
End: 2017-07-05
Payer: MEDICARE

## 2017-07-07 ENCOUNTER — APPOINTMENT (OUTPATIENT)
Dept: PHYSICAL THERAPY | Age: 72
End: 2017-07-07
Payer: MEDICARE

## 2017-07-10 ENCOUNTER — HOSPITAL ENCOUNTER (OUTPATIENT)
Dept: PHYSICAL THERAPY | Age: 72
Discharge: HOME | End: 2017-07-10
Payer: MEDICARE

## 2017-07-10 PROCEDURE — 97530 THERAPEUTIC ACTIVITIES: CPT

## 2017-07-10 PROCEDURE — G8979 MOBILITY GOAL STATUS: HCPCS

## 2017-07-10 PROCEDURE — 97110 THERAPEUTIC EXERCISES: CPT

## 2017-07-10 PROCEDURE — G8978 MOBILITY CURRENT STATUS: HCPCS

## 2017-07-10 NOTE — PROGRESS NOTES
PT DAILY TREATMENT NOTE - [facility]     Patient Name: Zacarias Nuñez  Date:7/10/2017  : 1945  [x]  Patient  Verified  Payor: Toya Bolden / Plan: [place] MEDICARE PART A & B / Product Type: Medicare /    In time:520  Out time:601  Total Treatment Time (min): 41  Total Timed Codes (min): 41  1:1 Treatment Time ( [address] only): 41   Visit #: 9 of 12    Treatment Area: There are no admission diagnoses documented for this encounter. SUBJECTIVE  Pain Level (0-10 scale): 4  Any medication changes, allergies to medications, adverse drug reactions, diagnosis change, or new procedure performed?: [x] No    [] Yes (see summary sheet for update)  Subjective functional status/changes:   [] No changes reported  \"Ill give you the best I've got today. \"    OBJECTIVE    30 min Therapeutic Exercise:  [x] See flow sheet :   Rationale: increase ROM, increase strength, improve coordination, improve balance and increase proprioception to improve the patients ability to perform ADL's with reduced pain levels. 11 min Therapeutic Activity:  [x]  See flow sheet :   Rationale: increase ROM, increase strength, improve coordination, improve balance and increase proprioception  to improve the patients ability to perform ADL's with reduced pain levels. With   [] TE   [] TA   [] neuro   [] other: Patient Education: [x] Review HEP    [] Progressed/Changed HEP based on:   [] positioning   [] body mechanics   [] transfers   [] heat/ice application    [] other:      Other Objective/Functional Measures: see goals below     Pain Level (0-10 scale) post treatment: 6    ASSESSMENT/Changes in Function: Pt remains pain focused throughout session. Pt has not been in therapy in >10 days due to pain and reports of dropping computer on knee. Remains with swelling and limited ROM.       Patient will continue to benefit from skilled PT services to modify and progress therapeutic interventions, address functional mobility deficits, address ROM deficits, address strength deficits, analyze and address soft tissue restrictions, analyze and cue movement patterns, analyze and modify body mechanics/ergonomics, assess and modify postural abnormalities and instruct in home and community integration to attain remaining goals. []  See Plan of Care  [x]  See progress note/recertification  []  See Discharge Summary         Progress towards goals / Updated goals:  Short Term Goals: To be accomplished in 2 weeks:  1. Patient will be independent and compliant with HEP to achieve other goals. Status at eval: not independent/compliant  Current: reports compliance      2. Increase right knee AROM ext to 0 to normalize gait. Status at eval: 10 degrees  Current: lacking 4 degrees       3. Increase right knee AROM flex >/= 105 to normalize ADL's. Status at eval: 90 degrees  Current: 103 AAROM led by pt tolerance with pain and tears      Long Term Goals: To be accomplished in 4 weeks:  1. Improve FOTO score to >/= 66/100 to indicate decreased pain with ADL's. Status at eval: 56/100  Current: 64--progressing    2. Increase right knee AROM flex >/= 120 to normalize ADL's. Status at eval: 90 degrees  Current: 101- not met, slow progress    3. Increase right knee flex strength to 5/5 to normalize function. Status at eval: 4/5  Current: 4/5 remains unchanged- not met      4. Increase right HS flexibility by >/= 10 degrees to normalize function.   Status at eval: 20 degrees  Current:>15 degrees-- MET    PLAN  [x]  Upgrade activities as tolerated     []  Continue plan of care  []  Update interventions per flow sheet       []  Discharge due to:_  []  Other:_      Joe Sy, PT 7/10/2017  5:26 PM    Future Appointments  Date Time Provider Aquilino Tirado   7/10/2017 5:30 PM Karla Favre [facility]

## 2017-07-10 NOTE — PROGRESS NOTES
[facility] [place] [address] [place] [address] Street  Phone: [phone]   Fax: [phone]    Continued Plan of Care/ Re-certification for Physical Therapy Services    Patient name: Flakita Fuller Start of Care: 17   Referral source: Naresh Juan MD : 1945   Medical/Treatment Diagnosis: There are no admission diagnoses documented for this encounter. Onset Date:17     Prior Hospitalization: see medical history Provider#: [id number]   Medications: Verified on Patient Summary List    Comorbidities: depression, osteoporosis, arthritis, thyroid problems, high blood pressure, hearing impaired, hx breast cancer, right mastectomy, right breast reconstruction, left meniscus repair, hysterectomy, gastric bypass   Prior Level of Function: independent with ADL's, ambulating without AD, driving        Visits from Start of Care: 9    Missed Visits: 5    The Plan of Care and following information is based on the patient's current status:  Short Term Goals: To be accomplished in 2 weeks:  1. Patient will be independent and compliant with HEP to achieve other goals. Status at eval: not independent/compliant  Current: reports compliance      2. Increase right knee AROM ext to 0 to normalize gait. Status at eval: 10 degrees  Current: lacking 4 degrees       3. Increase right knee AROM flex >/= 105 to normalize ADL's. Status at eval: 90 degrees  Current: 103 AAROM led by pt tolerance with pain and tears-- progressing      Long Term Goals: To be accomplished in 4 weeks:  1. Improve FOTO score to >/= 66/100 to indicate decreased pain with ADL's. Status at eval: 56/100  Current: 64--progressing     2. Increase right knee AROM flex >/= 120 to normalize ADL's. Status at eval: 90 degrees  Current: 101- not met, slow progress     3. Increase right knee flex strength to 5/5 to normalize function. Status at eval: 4/5  Current: 4/5 remains unchanged- not met        4.  Increase right HS flexibility by >/= 10 degrees to normalize function. Status at eval: 20 degrees  Current:>15 degrees-- MET    Key functional changes: 101 AROM, 103 AAROM,  Unchanged strength, improving HS flexibility, pt subjective reports of decreased function     Problems/ barriers to goal attainment: none     Problem List: pain affecting function, decrease ROM, decrease strength, impaired gait/ balance, decrease ADL/ functional abilitiies, decrease activity tolerance, decrease flexibility/ joint mobility and decrease transfer abilities    Treatment Plan: Therapeutic exercise, Therapeutic activities, Neuromuscular re-education, Physical agent/modality, Gait/balance training, Manual therapy, Aquatic therapy, Patient education, Self Care training and Functional mobility training     Patient Goal (s) has been updated and includes: \"I want to get as close to 107 on my own\"     Goals for this certification period to be accomplished in 4 weeks:  Cont with unmet goals    Frequency / Duration: Patient to be seen 2 times per week for 4 weeks:    Assessment / Recommendations:Pt has attended 9 PT visits with slow progress toward goals. Pt has been limited in attendance due to pain. Lacking ROM that is WNL in ext and flexion. Strength remains unchanged. Pt would continue to benefit from skilled PT to improve ROM, strength, gait, and reduce pain. G-Codes (GP)  Mobility  [id number] Current  CJ= 20-39%  [id number] Goal  CJ= 20-39%      The severity rating is based on clinical judgment and the FOTO score. Certification Period: 7/10/17-8/8/17    Celestino Clarke, PT 7/10/2017 5:42 PM    ________________________________________________________________________  I certify that the above Therapy Services are being furnished while the patient is under my care. I agree with the treatment plan and certify that this therapy is necessary. [] I have read the above and request that my patient continue as recommended.   [] I have read the above report and request that my patient continue therapy with the following changes/special instructions: ______________________________________  [] I have read the above report and request that my patient be discharged from therapy    Physician's Signature:_______________________________Date:___________Time:__________    Please sign and return to   [facility] at [address]     Phone: [phone]   Fax: [phone]

## 2017-07-13 ENCOUNTER — HOSPITAL ENCOUNTER (OUTPATIENT)
Dept: PHYSICAL THERAPY | Age: 72
Discharge: HOME | End: 2017-07-13
Payer: MEDICARE

## 2017-07-13 PROCEDURE — 97110 THERAPEUTIC EXERCISES: CPT

## 2017-07-13 NOTE — PROGRESS NOTES
PT DAILY TREATMENT NOTE - [facility]     Patient Name: Jennifer First  Date:2017  : 1945  [x]  Patient  Verified  Payor: Kanchan Glen Daniel / Plan: [place] MEDICARE PART A & B / Product Type: Medicare /    In time:12:25  Out time:01:05  Total Treatment Time (min): 40  Total Timed Codes (min): 40  1:1 Treatment Time ([address] only): 40   Visit #: 10 of 12    Treatment Area: There are no admission diagnoses documented for this encounter. SUBJECTIVE  Pain Level (0-10 scale): Any medication changes, allergies to medications, adverse drug reactions, diagnosis change, or new procedure performed?: [x] No    [] Yes (see summary sheet for update)  Subjective functional status/changes:   [x] No changes reported      OBJECTIVE    40 min Therapeutic Exercise:  [x] See flow sheet :   Rationale: increase ROM, increase strength and improve coordination to improve the patients ability to squat down and ambulate prolonged distances      With   [] TE   [] TA   [] neuro   [] other: Patient Education: [x] Review HEP    [] Progressed/Changed HEP based on:   [] positioning   [] body mechanics   [] transfers   [] heat/ice application    [] other:      Other Objective/Functional Measures: FOTO: 56  Knee flexion AAROM: 115 degrees with strap  Knee extension AROM: 7 degrees    Pain Level (0-10 scale) post treatment:     ASSESSMENT/Changes in Function:   Patient required vc's on form with exercises to increase efficiency. Patient will continue to benefit from skilled PT services to modify and progress therapeutic interventions, address functional mobility deficits, address ROM deficits, address strength deficits and analyze and address soft tissue restrictions to attain remaining goals. []  See Plan of Care  []  See progress note/recertification  []  See Discharge Summary         Progress towards goals / Updated goals:  Short Term Goals: To be accomplished in 2 weeks:  1.  Patient will be independent and compliant with HEP to achieve other goals. Status at eval: not independent/compliant  Current: reports compliance      2. Increase right knee AROM ext to 0 to normalize gait. Status at eval: 10 degrees  Current: lacking 7 degrees       3. Increase right knee AROM flex >/= 105 to normalize ADL's. Status at eval: 90 degrees  Current: 115 AAROM led by pt tolerance with pain and tears-- progressing      Long Term Goals: To be accomplished in 4 weeks:  1. Improve FOTO score to >/= 66/100 to indicate decreased pain with ADL's. Status at eval: 56/100  Current: 56--decrease since last assessed      2. Increase right knee AROM flex >/= 120 to normalize ADL's. Status at eval: 90 degrees  Current: 101- not met, slow progress      3. Increase right knee flex strength to 5/5 to normalize function. Status at eval: 4/5  Current: 4/5 remains unchanged- not met          4. Increase right HS flexibility by >/= 10 degrees to normalize function.   Status at eval: 20 degrees  Current:>15 degrees-- MET    PLAN  []  Upgrade activities as tolerated     []  Continue plan of care  []  Update interventions per flow sheet       []  Discharge due to:_  []  Other:_      Awais Butler 7/13/2017  12:38 PM    Future Appointments  Date Time Provider Aquilino Tirado   7/19/2017 3:00 PM Brad Esparza, PT [facility]   7/24/2017 2:30 PM Brad Esparza, PT [facility]   7/26/2017 4:00 PM TODD Reyna [facility]   7/31/2017 2:00 PM Parish Quintana [facility]

## 2017-07-19 ENCOUNTER — HOSPITAL ENCOUNTER (OUTPATIENT)
Dept: PHYSICAL THERAPY | Age: 72
Discharge: HOME | End: 2017-07-19
Payer: MEDICARE

## 2017-07-19 PROCEDURE — 97110 THERAPEUTIC EXERCISES: CPT

## 2017-07-19 NOTE — PROGRESS NOTES
PT DAILY TREATMENT NOTE - [facility]     Patient Name: Jacinto Lopez  Date:2017  : 1945  [x]  Patient  Verified  Payor: Felecia Molina / Plan: [place] MEDICARE PART A & B / Product Type: Medicare /    In time:1025  Out time:1108  Total Treatment Time (min): 43  Total Timed Codes (min): 43  1:1 Treatment Time ([address] only): 40   Visit #: 11 of 17    Treatment Area: Knee pain, right [M25.561]    SUBJECTIVE  Pain Level (0-10 scale): 5/10  Any medication changes, allergies to medications, adverse drug reactions, diagnosis change, or new procedure performed?: [x] No    [] Yes (see summary sheet for update)  Subjective functional status/changes:   [] No changes reported  It was a 10 this morning.     OBJECTIVE    Modality rationale:    Min Type Additional Details    [] Estim:  []Unatt       []IFC  []Premod                        []Other:  []w/ice   []w/heat  Position:  Location:    [] Estim: []Att    []TENS instruct  []NMES                    []Other:  []w/US   []w/ice   []w/heat  Position:  Location:    []  Traction: [] Cervical       []Lumbar                       [] Prone          []Supine                       []Intermittent   []Continuous Lbs:  [] before manual  [] after manual    []  Ultrasound: []Continuous   [] Pulsed                           []1MHz   []3MHz W/cm2:  Location:    []  Iontophoresis with dexamethasone         Location: [] Take home patch   [] In clinic    []  Ice     []  heat  []  Ice massage  []  Laser   []  Anodyne Position:  Location:    []  Laser with stim  []  Other:  Position:  Location:    []  Vasopneumatic Device Pressure:       [] lo [] med [] hi   Temperature: [] lo [] med [] hi   [] Skin assessment post-treatment:  []intact []redness- no adverse reaction    []redness  adverse reaction:      min []Eval                  []Re-Eval       43 min Therapeutic Exercise:  [x] See flow sheet :   Rationale: increase ROM, increase strength, improve balance and increase proprioception to improve the patients ability to normalize gait and function     min Therapeutic Activity:  []  See flow sheet :         min Neuromuscular Re-education:  []  See flow sheet :        min Manual Therapy:         min Gait Training:  ___ feet with ___ device on level surfaces with ___ level of assist   Rationale: With   [] TE   [] TA   [] neuro   [] other: Patient Education: [x] Review HEP    [] Progressed/Changed HEP based on:   [] positioning   [] body mechanics   [] transfers   [] heat/ice application    [] other:      Other Objective/Functional Measures:   Right knee ROM: 7 (actively) - 110 (active-assisted)     Pain Level (0-10 scale) post treatment: 5/10    ASSESSMENT/Changes in Function: No new progress. Patient will continue to benefit from skilled PT services to modify and progress therapeutic interventions, address ROM deficits, address strength deficits, analyze and address soft tissue restrictions, analyze and cue movement patterns and assess and modify postural abnormalities to attain remaining goals. []  See Plan of Care  []  See progress note/recertification  []  See Discharge Summary         Progress towards goals / Updated goals:  Short Term Goals: To be accomplished in 2 weeks:  1. Patient will be independent and compliant with HEP to achieve other goals. Status at eval: not independent/compliant  Status at last note: reports compliance  Current: NT      2. Increase right knee AROM ext to 0 to normalize gait. Status at eval: 10 degrees  Status at last note: lacking 4 degrees   Current: lacking 7 degrees      3. Increase right knee AROM flex >/= 105 to normalize ADL's. Status at eval: 90 degrees  Status at last note: 103 AAROM led by pt tolerance with pain and tears-- progressing  Current: 115 AAROM led by pt tolerance with pain and tears-- progressing      Long Term Goals: To be accomplished in 4 weeks:  1. Improve FOTO score to >/= 66/100 to indicate decreased pain with ADL's.   Status at eval: 56/100  Status at last note:64--progressing  Current: 56--decrease since last assessed      2. Increase right knee AROM flex >/= 120 to normalize ADL's. Status at eval: 90 degrees  Status at last note:101- not met, slow progress  Current: 115 AAROM led by pt tolerance with pain and tears-- progressing      3. Increase right knee flex strength to 5/5 to normalize function.   Status at eval: 4/5  Status at last note: 4/5 remains unchanged- not met  Current: NT        PLAN  [x]  Upgrade activities as tolerated     [x]  Continue plan of care  []  Update interventions per flow sheet       []  Discharge due to:_  []  Other:_      Glendy Burrows PT 7/19/2017  10:28 AM    Future Appointments  Date Time Provider Aquilino Tirado   7/19/2017 10:30 AM TODD Davison [facility]   7/24/2017 2:30 PM TODD Davison [facility]   7/26/2017 4:00 PM TODD Davila [facility]   7/31/2017 2:00 PM Obey Whitney [facility]

## 2017-07-24 ENCOUNTER — HOSPITAL ENCOUNTER (OUTPATIENT)
Dept: PHYSICAL THERAPY | Age: 72
End: 2017-07-24
Payer: MEDICARE

## 2017-07-26 ENCOUNTER — HOSPITAL ENCOUNTER (OUTPATIENT)
Dept: PHYSICAL THERAPY | Age: 72
Discharge: HOME | End: 2017-07-26
Payer: MEDICARE

## 2017-07-26 PROCEDURE — 97112 NEUROMUSCULAR REEDUCATION: CPT

## 2017-07-26 PROCEDURE — 97110 THERAPEUTIC EXERCISES: CPT

## 2017-07-26 NOTE — PROGRESS NOTES
PT DAILY TREATMENT NOTE - [facility]     Patient Name: Jacqueline Headings  Date:2017  : 1945  [x]  Patient  Verified  Payor: Maxwell Valerio / Plan: [place] MED[place] PART A & B / Product Type: Medicare /    In time:400  Out time:435  Total Treatment Time (min): 35  Total Timed Codes (min): 35  1:1 Treatment Time ( [address] only): 30   Visit #: 12 of 17    Treatment Area: Knee pain, right [M25.561]    SUBJECTIVE  Pain Level (0-10 scale): 0  Any medication changes, allergies to medications, adverse drug reactions, diagnosis change, or new procedure performed?: [x] No    [] Yes (see summary sheet for update)  Subjective functional status/changes:   [] No changes reported  \"If it felt any better, there would be 2 of them. \"    OBJECTIVE  25 min Therapeutic Exercise:  [x] See flow sheet :   Rationale: increase ROM, increase strength and improve coordination to improve the patients ability to perform ADL's with reduced pain levels. 10 min Neuromuscular Re-education:  [x]  See flow sheet :   Rationale: increase ROM, increase strength, improve coordination, improve balance and increase proprioception  to improve the patients ability to perform ADL's with reduced pain levels. With   [] TE   [] TA   [] neuro   [] other: Patient Education: [x] Review HEP    [] Progressed/Changed HEP based on:   [] positioning   [] body mechanics   [] transfers   [] heat/ice application    [] other:      Other Objective/Functional Measures:  See goals    Pain Level (0-10 scale) post treatment: 0    ASSESSMENT/Changes in Function: Pt tolerated session well. Improving ROM. Consider d/c next week if ROM and goals met/progressing.     Patient will continue to benefit from skilled PT services to modify and progress therapeutic interventions, address functional mobility deficits, address ROM deficits, address strength deficits, analyze and address soft tissue restrictions, analyze and cue movement patterns, analyze and modify body mechanics/ergonomics, assess and modify postural abnormalities and instruct in home and community integration to attain remaining goals. []  See Plan of Care  []  See progress note/recertification  []  See Discharge Summary         Progress towards goals / Updated goals:  Short Term Goals: To be accomplished in 2 weeks:  1. Patient will be independent and compliant with HEP to achieve other goals. Status at eval: not independent/compliant  Status at last note: reports compliance  Current: NT      2. Increase right knee AROM ext to 0 to normalize gait. Status at eval: 10 degrees  Status at last note: lacking 4 degrees   Current: lacking 7 degrees- remains      3. Increase right knee AROM flex >/= 105 to normalize ADL's. Status at eval: 90 degrees  Status at last note: 103 AAROM led by pt tolerance with pain and tears-- progressing  Current: 115 AAROM led by pt tolerance with pain and tears-- progressing      Long Term Goals: To be accomplished in 4 weeks:  1. Improve FOTO score to >/= 66/100 to indicate decreased pain with ADL's. Status at eval: 56/100  Status at last note:64--progressing  Current: 56--decrease since last assessed      2. Increase right knee AROM flex >/= 120 to normalize ADL's. Status at eval: 90 degrees  Status at last note:101- not met, slow progress  Current: AROM 114, PROM 117      3. Increase right knee flex strength to 5/5 to normalize function.   Status at eval: 4/5  Status at last note: 4/5 remains unchanged- not met  Current: NT    PLAN  []  Upgrade activities as tolerated     []  Continue plan of care  []  Update interventions per flow sheet       []  Discharge due to:_  []  Other:_      Talisha Quick, PT 7/26/2017  3:54 PM    Future Appointments  Date Time Provider Aquilino Tirado   7/26/2017 4:00 PM Madhav Miller [facility]   7/31/2017 2:00 PM Madhav Miller [facility]

## 2017-07-27 ENCOUNTER — HOSPITAL ENCOUNTER (OUTPATIENT)
Dept: PHYSICAL THERAPY | Age: 72
End: 2017-07-27
Payer: MEDICARE

## 2017-07-31 ENCOUNTER — HOSPITAL ENCOUNTER (OUTPATIENT)
Dept: PHYSICAL THERAPY | Age: 72
Discharge: HOME | End: 2017-07-31
Payer: MEDICARE

## 2017-07-31 PROCEDURE — G8978 MOBILITY CURRENT STATUS: HCPCS

## 2017-07-31 PROCEDURE — 97530 THERAPEUTIC ACTIVITIES: CPT

## 2017-07-31 PROCEDURE — G8979 MOBILITY GOAL STATUS: HCPCS

## 2017-07-31 PROCEDURE — G8980 MOBILITY D/C STATUS: HCPCS

## 2017-07-31 PROCEDURE — 97110 THERAPEUTIC EXERCISES: CPT

## 2017-07-31 NOTE — PROGRESS NOTES
[facility] [place] [facility] [place] [address] [place] [address] Street  Phone: [phone]   Fax: [phone]    Medicare Progress Report      Patient name: Bhavesh Ayala     Start of Care: 17  Referral source: Sharon Delaney MD    : 1945  Medical/Treatment Diagnosis: Knee pain, right [M25.561]  Onset Date:17  Prior Hospitalization: see medical history   Provider#: [id number]  Comorbidities: depression, osteoporosis, arthritis, thyroid problems, high blood pressure, hearing impaired, hx breast cancer, right mastectomy, right breast reconstruction, left meniscus repair, hysterectomy, gastric bypass   Prior Level of Function: independent with ADL's, ambulating without AD, driving  Medications: Verified on Patient Summary List    Visits from Start of Care: 13    Missed Visits: 3=  Reporting Period : 17 to 17    Subjective Reports: Short Term Goals: To be accomplished in 2 weeks:  1. Patient will be independent and compliant with HEP to achieve other goals. Status at eval: not independent/compliant  Status at last note: reports compliance  Current: reports continued compliance-- MET      2. Increase right knee AROM ext to 0 to normalize gait. Status at eval: 10 degrees  Status at last note: lacking 4 degrees   Current: lacking 5 degrees- progressing     3. Increase right knee AROM flex >/= 105 to normalize ADL's. Status at eval: 90 degrees  Status at last note: 103 AAROM led by pt tolerance with pain and tears-- progressing  Current: 114 AROM, 117 PROM-- MET      Long Term Goals: To be accomplished in 4 weeks:  1. Improve FOTO score to >/= 66/100 to indicate decreased pain with ADL's. Status at eval: 56/100  Status at last note:64--progressing  Current: 79-- MET      2. Increase right knee AROM flex >/= 120 to normalize ADL's. Status at eval: 90 degrees  Status at last note:101- not met, slow progress  Current: AROM 114, PROM 117-- progressing      3.  Increase right knee flex strength to 5/5 to normalize function. Status at eval: 4/5  Status at last note: 4/5 remains unchanged- not met  Current: 5/5-- MET    Key functional changes: lacking 5-114 AROM,   95% subjective improvements, FOT 79    Problems/ barriers to goal attainment: none     Assessment / Recommendations:Met or is progressing toward all goals. Notes 95% improvements with PT. Pt verbalized compliance and preparedness with d/c to HEP. PT in agreement on d/c status and to d/c if MD clears at appointment on 8/2/17. Problem List: decrease ROM and decrease ADL/ functional abilitiies   Treatment Plan: Therapeutic exercise, Therapeutic activities, Neuromuscular re-education, Physical agent/modality, Manual therapy, Patient education, Self Care training and Functional mobility training    Patient Goal (s) has been updated and includes: \"I think I am ready to do this on my own. \"       Frequency / Duration: Pt to be discharged pending MD recommendations. G-Codes (GP)  Mobility  [id number] Current  CJ= 20-39%   Goal  CJ= 20-39%  D/C  CJ= 20-39%      The severity rating is based on clinical judgement and the FOTO score.       Carla Stevens, PT 7/31/2017 3:09 PM

## 2017-07-31 NOTE — PROGRESS NOTES
PT DAILY TREATMENT NOTE - [facility]     Patient Name: Ronda Dutta  Date:2017  : 1945  -  Patient  Verified  Payor: Graham Mira / Plan: [place] MEDICARE PART A & B / Product Type: Medicare /    In time:200  Out time:245  Total Treatment Time (min): 45  Total Timed Codes (min): 45  1:1 Treatment Time ( [address] only): 39   Visit #: 13 of 17    Treatment Area: Knee pain, right [M25.561]    SUBJECTIVE  Pain Level (0-10 scale): 0  Any medication changes, allergies to medications, adverse drug reactions, diagnosis change, or new procedure performed?: - No    - Yes (see summary sheet for update)  Subjective functional status/changes:   - No changes reported  \"It's just tight, no pain. \"    OBJECTIVE          35 min Therapeutic Exercise:  [] See flow sheet :   Rationale: increase ROM, increase strength, improve coordination, improve balance and increase proprioception to improve the patients ability to perform ADL's with reduced pain levels. 10 min Therapeutic Activity:  [x]  See flow sheet : HEP, goal update and review, discharge instructions   Rationale: increase ROM, increase strength, improve coordination, improve balance and increase proprioception  to improve the patients ability to perform ADL's with reduced pain levels. With   [] TE   [] TA   [] neuro   [] other: Patient Education: [x] Review HEP    [] Progressed/Changed HEP based on:   [] positioning   [] body mechanics   [] transfers   [] heat/ice application    [] other:      Other Objective/Functional Measures: see goals     Pain Level (0-10 scale) post treatment: 0    ASSESSMENT/Changes in Function: Pt tolerated session well. Met or is progressing toward all goals. Notes 95% improvements with PT. Pt verbalized compliance and preparedness with d/c to HEP. PT in agreement on d/c status and to d/c if MD clears at appointment on 17.     Patient will continue to benefit from skilled PT services to modify and progress therapeutic interventions, address functional mobility deficits, address ROM deficits, address strength deficits, analyze and address soft tissue restrictions, analyze and cue movement patterns, analyze and modify body mechanics/ergonomics, assess and modify postural abnormalities and instruct in home and community integration to attain remaining goals. []  See Plan of Care  [x]  See progress note/recertification  []  See Discharge Summary         Progress towards goals / Updated goals:  Short Term Goals: To be accomplished in 2 weeks:  1. Patient will be independent and compliant with HEP to achieve other goals. Status at eval: not independent/compliant  Status at last note: reports compliance  Current: reports continued compliance-- MET      2. Increase right knee AROM ext to 0 to normalize gait. Status at eval: 10 degrees  Status at last note: lacking 4 degrees   Current: lacking 5 degrees- progressing    3. Increase right knee AROM flex >/= 105 to normalize ADL's. Status at eval: 90 degrees  Status at last note: 103 AAROM led by pt tolerance with pain and tears-- progressing  Current: 114 AROM, 117 PROM-- MET      Long Term Goals: To be accomplished in 4 weeks:  1. Improve FOTO score to >/= 66/100 to indicate decreased pain with ADL's. Status at eval: 56/100  Status at last note:64--progressing  Current: 56--decrease since last assessed      2. Increase right knee AROM flex >/= 120 to normalize ADL's. Status at eval: 90 degrees  Status at last note:101- not met, slow progress  Current: AROM 114, PROM 117-- progressing      3. Increase right knee flex strength to 5/5 to normalize function.   Status at eval: 4/5  Status at last note: 4/5 remains unchanged- not met  Current: 5/5-- progressing    PLAN  [x]  Upgrade activities as tolerated     []  Continue plan of care  []  Update interventions per flow sheet       []  Discharge due to:_  []  Other:_      Alberto Sierra, PT 7/31/2017  2:00 PM    No future appointments.

## 2017-08-07 NOTE — PROGRESS NOTES
[facility] [place] [facility] [place], [address] [place] [address]  Phone: [phone]   Fax: [phone]    Discharge Summary    Patient name: Irene Ojeda     Start of Care: 17  Referral source: Nano Purcell MD    : 1945  Medical/Treatment Diagnosis: Knee pain, right [M25.561]  Onset Date:17  Prior Hospitalization: see medical history   Provider#: [id number]  Comorbidities: depression, osteoporosis, arthritis, thyroid problems, high blood pressure, hearing impaired, hx breast cancer, right mastectomy, right breast reconstruction, left meniscus repair, hysterectomy, gastric bypass   Prior Level of Function: independent with ADL's, ambulating without AD, driving  Medications: Verified on Patient Summary List    Visits from Start of Care: 13    Missed Visits: 5  Reporting Period : 17 to 17    Short Term Goals: To be accomplished in 2 weeks:  1. Patient will be independent and compliant with HEP to achieve other goals. Status at eval: not independent/compliant  Status at last note: reports compliance  Current: reports continued compliance-- MET      2. Increase right knee AROM ext to 0 to normalize gait. Status at eval: 10 degrees  Status at last note: lacking 4 degrees   Current: lacking 5 degrees- progressing      3. Increase right knee AROM flex >/= 105 to normalize ADL's. Status at eval: 90 degrees  Status at last note: 103 AAROM led by pt tolerance with pain and tears-- progressing  Current: 114 AROM, 117 PROM-- MET      Long Term Goals: To be accomplished in 4 weeks:  1. Improve FOTO score to >/= 66/100 to indicate decreased pain with ADL's. Status at eval: 56/100  Status at last note:64--progressing  Current: 79-- MET      2. Increase right knee AROM flex >/= 120 to normalize ADL's. Status at eval: 90 degrees  Status at last note:101- not met, slow progress  Current: AROM 114, PROM 117-- progressing      3.  Increase right knee flex strength to 5/5 to normalize function. Status at eval: 4/5  Status at last note: 4/5 remains unchanged- not met  Current: 5/5-- MET    G-Codes (GP)  Mobility    Goal  CJ= 20-39%  D/C  CJ= 20-39%      The severity rating is based on clinical judgment and the FOTO score. Assessment/ Summary of Care: Physical therapy consisted of exercises to target right knee pain and improve function following TKR. Treatment strategies included therapeutic exercises, therapeutic activity, neuromuscular re-education, patient education, and manual therapy to improve tolerance to ambulation, household chores, and ADLs. Pt performed well in therapy, progressing toward goals. Due to progress, pt to be discharged at this time.      RECOMMENDATIONS:  [x]Discontinue therapy: [x]Patient has reached or is progressing toward set goals      []Patient is non-compliant or has abdicated      []Due to lack of appreciable progress towards set goals    Taina Elizabeth, PT 8/7/2017 3:24 PM

## 2018-07-30 ENCOUNTER — HOSPITAL ENCOUNTER (OUTPATIENT)
Dept: PHYSICAL THERAPY | Age: 73
Discharge: HOME | End: 2018-07-30
Payer: MEDICARE

## 2018-07-30 PROCEDURE — G8987 SELF CARE CURRENT STATUS: HCPCS | Performed by: PHYSICAL THERAPIST

## 2018-07-30 PROCEDURE — 97161 PT EVAL LOW COMPLEX 20 MIN: CPT | Performed by: PHYSICAL THERAPIST

## 2018-07-30 PROCEDURE — G8988 SELF CARE GOAL STATUS: HCPCS | Performed by: PHYSICAL THERAPIST

## 2018-07-30 PROCEDURE — 97110 THERAPEUTIC EXERCISES: CPT | Performed by: PHYSICAL THERAPIST

## 2018-07-30 NOTE — PROGRESS NOTES
[facility] at [address] [place] [address], [address] [place] [address] Phone: [phone]   Fax: [phone] Plan of Care/ Statement of Necessity for Physical Therapy Services Patient name: Zackery Elaine Start of Care: 2018 Referral source: Daya Hare MD : 1945 Medical Diagnosis: Pain in left arm [M79.602] Unspecified fracture of upper end of left humerus, initial encounter for closed fracture [S42.202A] Onset Date:7/3/2018 Treatment Diagnosis: left shoulder pain Prior Hospitalization: see medical history Provider#: [id number] Medications: Verified on Patient summary List  
 Comorbidities:  OA, HTN, hypothyroidism, hx breast CA (10 yrs), depression, osteoporosis Prior Level of Function: walking, sedentary otherwise The Plan of Care and following information is based on the information from the initial evaluation. Assessment/ key information: Patient is 4 weeks s/p left proximal humerus fracture, with non-operative management. Currently reports only pain with activity. ROM is above anticipated. Osteoporosis will be a factor to consider with exercise progression. Patient will continue to benefit from skilled PT services to modify and progress therapeutic interventions, address functional mobility deficits, address ROM deficits, address strength deficits, analyze and address soft tissue restrictions, analyze and cue movement patterns, analyze and modify body mechanics/ergonomics and assess and modify postural abnormalities to attain remaining goals. 
   
Evaluation Complexity History HIGH Complexity :3+ comorbidities / personal factors will impact the outcome/ POC ; Examination MEDIUM Complexity : 3 Standardized tests and measures addressing body structure, function, activity limitation and / or participation in recreation  ;Presentation LOW Complexity : Stable, uncomplicated  ;Clinical Decision Making MEDIUM Complexity : FOTO score of 26-74 Overall Complexity Rating: LOW Problem List: pain affecting function, decrease ROM, decrease strength, decrease ADL/ functional abilitiies, decrease activity tolerance and decrease flexibility/ joint mobility Treatment Plan may include any combination of the following: Therapeutic exercise, Therapeutic activities, Neuromuscular re-education, Physical agent/modality, Manual therapy, Patient education and Self Care training Patient / Family readiness to learn indicated by: asking questions, trying to perform skills and interest 
Persons(s) to be included in education: patient (P) Barriers to Learning/Limitations: None Patient Goal (s): to be able to use my left shoulder & arm normally without pain.  Patient Self Reported Health Status: good Rehabilitation Potential: good Short Term Goals: To be accomplished in 2 weeks: 
 Patient will report compliance with HEP at least 1x/day to aid in rehabilitation program. 
 Status at IE: NA 
 Current: 
 
 Patient will display pain free AROM into 0-165 flexion to aid in completion of ADLs. Status at IE:pain into 160 degrees Current: 
 
 
Long Term Goals: To be accomplished in 8 weeks: 
 Patient will report compliance with HEP a least 3-4x/week to aid in rehabilitation/strengthening program. 
 Status at IE:NA Current: 
 
 Patient will report no pain greater than 1-2/10 with overhead activities to aid in completion of ADLs. Status at IE:5/10 Current: 
 
 Patient will increase B UE strength to 5/5 throughout all planes to aid in completion of ADLs. Status at IE: 4/5 in R shoulder, L UE not tested due to precautions Current: 
 
 Patient will increase FOTO score to 59 points overall to demonstrate improvement in functional status. Status at IE:31 Current: 
 
 
Frequency / Duration: Patient to be seen 2 times per week for 8 weeks.  
 
Patient/ Caregiver education and instruction: Diagnosis, prognosis, self care, activity modification and exercises 
 [x]  Plan of care has been reviewed with PTA 
 
G-Codes (GP) Self Care  Current  CL= 60-79%  Goal  CK= 40-59% The severity rating is based on clinical judgment and the FOTO score. Certification Period: 7/30/2018 - 9/24/2018 Darlyn Cobos PT, DPT 7/30/2018 1:16 PM 
_____________________________________________________________________ I certify that the above Therapy Services are being furnished while the patient is under my care. I agree with the treatment plan and certify that this therapy is necessary. [de-identified] Signature:____________________  Date:__________Time:______ Please sign and return to [facility] at [address] [place] [address] Phone: [phone]   Fax: [phone]

## 2018-07-30 NOTE — PROGRESS NOTES
PT DAILY TREATMENT NOTE/SHOULDER EVAL 3-16 Patient Name: Lily James Date:2018 : 1945 [x]  Patient  Verified Payor: [place] MEDICARE / Plan: Adria Correiay / Product Type: Medicare / In time:10:10  Out time:11:00 Total Treatment Time (min): 50 Total Timed Codes (min): 25 
1:1 Treatment Time ( only): 50 Visit #: 1 of 16 Treatment Area: Pain in left arm [M79.602] Unspecified fracture of upper end of left humerus, initial encounter for closed fracture [S42.202A] SUBJECTIVE Pain Level (0-10 scale): 5 
[]constant []intermittent []improving []worsening []no change since onset Any medication changes, allergies to medications, adverse drug reactions, diagnosis change, or new procedure performed?: [x] No    [] Yes (see summary sheet for update) Subjective functional status/changes:    
Patient has CC of Left proximal humeral fracture . FIFI: 7/3/2018, fell running in the store. Patient describes pain as tight, throbbing, intermittent with activity. No diurnal features. Denies numbness/tingling. Denies popping/clicking. Aggravating factors: movement,turning over at night. Alleviating factors: pain medication (opioids). Denies red flags: SOB, chest pain, dizziness/lightheadedness, blurred/double vision, HA, chills/fevers, night sweats, change in bowel/bladder control, abdominal pain, difficulty swallowing, slurred speech, unexplained weight gain/loss. PMHx: OA, HTN, hypothyroidism, hx breast CA (10 yrs), depression, osteoporosis. Surgical Hx: Right TKA, gastric bypass. Social Hx: 2 level home,  Denies alcohol, denies tobacco, work status: retired. PLOF: walking, shopping, . CLOF: same. Diagnostic Imaging: x-ray: fracture still present OBJECTIVE/EXAMINATION 
 
25 min [x]Eval                  []Re-Eval  
 
 
25 min Therapeutic Exercise:  [x] See flow sheet :  
Rationale: increase ROM, increase strength and decrease pain to improve the patients ability to complete ADLs 
 
 
Access Code: [email or web address] URL: [email or web address]  
Date: 07/30/2018 Prepared by: Estefania Vazquez Exercises Supine Shoulder Flexion with Dowel - 5 reps - 2 sets - 2x daily - 7x weekly Standing Shoulder Abduction AAROM with Dowel - 5 reps - 2 sets - 2x daily - 7x weekly Standing Shoulder Internal Rotation AAROM with Dowel - 5 reps - 2 sets - 2x daily - 7x weekly With 
 [] TE 
 [] TA 
 [] neuro 
 [] other: Patient Education: [x] Review HEP [] Progressed/Changed HEP based on:  
[] positioning   [] body mechanics   [] transfers   [] heat/ice application   
[] other:   
 
 
Physical Therapy Evaluation - Shoulder Posture: [] Poor    [x] Fair    [] Good    Describe: 
 
ROM:  [] Unable to assess at this time AROM                                                              PROM Left Right  Left Right Flexion 160 p! 180 Flexion 160 p! Extension   Extension 160 p! Scaption/ p! Scaptin/ABD    
ER @ 0 Degrees   ER @ 0 Degrees 80 ER @ 90 Degrees   ER @ 90 Degrees 80 IR @ 90 Degrees   IR @ 90 Degrees 60 End Feel / Painful Arc: 
 
 
UE Strength:   [x] Unable to assess Left at this time L (1-5) R (1-5) Pain Flexion  4+ [] Yes   [x] No  
Abduction  4+ [] Yes   [x] No  
ER  4+ [] Yes   [] No  
IR  5 [] Yes   [] No  
Extension  5 [] Yes   [] No  
Elbow flexion  5 [] Yes   [] No  
Elbow Ext  5 [] Yes   [] No  
Wrist Flexion  5 [] Yes   [] No  
Wrist Extension  5 [] Yes   [] No  
 
 
Scapulohumoral Control / Rhythm: 
Able to eccentrically lower with good control? Left: [] Yes   [x] No     Right: [x] Yes   [] No 
 
 
Other Tests / Comments: Deferred Pain Level (0-10 scale) post treatment: 5 
 
ASSESSMENT/Changes in Function: Patient is 4 weeks s/p left proximal humerus fracture, with non-operative management.  Currently reports only pain with activity. ROM is good. No new issues. Patient will continue to benefit from skilled PT services to modify and progress therapeutic interventions, address functional mobility deficits, address ROM deficits, address strength deficits, analyze and address soft tissue restrictions, analyze and cue movement patterns, analyze and modify body mechanics/ergonomics and assess and modify postural abnormalities to attain remaining goals. [x]  See Plan of Care 
[]  See progress note/recertification 
[]  See Discharge Summary Progress towards goals / Updated goals: 
See POC PLAN 
[]  Upgrade activities as tolerated     [x]  Continue plan of care 
[]  Update interventions per flow sheet      
[]  Discharge due to:_ 
[]  Other:_ Cherelle Awad PT, DPT 7/30/2018  10:18 AM

## 2018-08-02 ENCOUNTER — HOSPITAL ENCOUNTER (OUTPATIENT)
Dept: PHYSICAL THERAPY | Age: 73
Discharge: HOME | End: 2018-08-02
Payer: MEDICARE

## 2018-08-02 PROCEDURE — 97140 MANUAL THERAPY 1/> REGIONS: CPT

## 2018-08-02 PROCEDURE — 97110 THERAPEUTIC EXERCISES: CPT

## 2018-08-02 NOTE — PROGRESS NOTES
PT DAILY TREATMENT NOTE  Patient Name: Ana Parmar Date:2018 : 1945 [x]  Patient  Verified Payor: [place] MEDICARE / Plan: Adria Nuñez y / Product Type: Medicare / In time: 4:16  Out time: 5:14 Total Treatment Time (min): 58 Timed treatment: 43 
1:1 Timed treatment: 40 Visit #: 2 of 16 Treatment Area: Pain in left arm [M79.602] Unspecified fracture of upper end of left humerus, initial encounter for closed fracture [S42.202A] SUBJECTIVE Pain Level (0-10 scale): 0/10 Any medication changes, allergies to medications, adverse drug reactions, diagnosis change, or new procedure performed?: [x] No    [] Yes (see summary sheet for update) Subjective functional status/changes:   [] No changes reported Pt did take pain medication before coming to PT though OBJECTIVE Modality rationale: decrease inflammation and decrease pain to improve the patients ability to tolerate ADLs Min Type Additional Details  
 [] Estim:  []Unatt       []IFC  []Premod []Other:  []w/ice   []w/heat Position: Location:  
 [] Estim: []Att    []TENS instruct  []NMES []Other:  []w/US   []w/ice   []w/heat Position: Location:  
 []  Traction: [] Cervical       []Lumbar 
                     [] Prone          []Supine []Intermittent   []Continuous Lbs: 
[] before manual 
[] after manual  
 []  Ultrasound: []Continuous   [] Pulsed []1MHz   []3MHz W/cm2: 
Location:  
 []  Iontophoresis with dexamethasone Location: [] Take home patch  
[] In clinic  
15 [x]  Ice     []  heat 
[]  Ice massage 
[]  Laser  
[]  Anodyne Position: supine (LEs elevated) Location: Left shoulder/arm  
 []  Laser with stim 
[]  Other:  Position: Location:  
 []  Vasopneumatic Device Pressure:       [] lo [] med [] hi  
Temperature: [] lo [] med [] hi  
[x] Skin assessment post-treatment:  [x]intact []redness- no adverse reaction 
  []redness  adverse reaction:  
 
35 min Therapeutic Exercise:  [x] See flow sheet :  
Rationale: increase ROM, increase strength, improve coordination and increase proprioception to improve the patients ability to improve ease with ADLs 
 
8 Manual DTM/TPR for Left Biceps to improve pain and muscles extensibility/strength for performing ADLs with ease With 
 [] TE 
 [] TA 
 [] neuro 
 [] other: Patient Education: [x] Review HEP [] Progressed/Changed HEP based on:  
[] positioning   [] body mechanics   [] transfers   [] heat/ice application   
[] other:   
 
Other Objective/Functional Measures:  
 Good AAROM, min-mod pain with all though Positive trigger points along Left Biceps Pain Level (0-10 scale) post treatment: 0/10 ASSESSMENT/Changes in Function: Initiated treatment as POC, pt demonstrated good motivation, having pain mostly with motions requiring ecc control. Patient will continue to benefit from skilled PT services to modify and progress therapeutic interventions, address functional mobility deficits, address ROM deficits, address strength deficits, analyze and address soft tissue restrictions, analyze and cue movement patterns, analyze and modify body mechanics/ergonomics, assess and modify postural abnormalities and instruct in home and community integration to attain remaining goals. [x]  See Plan of Care 
[]  See progress note/recertification 
[]  See Discharge Summary Progress towards goals / Updated goals: 
Short Term Goals: To be accomplished in 2 weeks: 
                      Patient will report compliance with HEP at least 1x/day to aid in rehabilitation program. 
                      Status at IE: NA 
                      Current: good compliance reported 8-2-18 
  
                      Patient will display pain free AROM into 0-165 flexion to aid in completion of ADLs. Status at IE:pain into 160 degrees Current: 
  
  
Long Term Goals: To be accomplished in 8 weeks: 
                      Patient will report compliance with HEP a least 3-4x/week to aid in rehabilitation/strengthening program. 
                      Status at IE:NA Current: 
  
                      Patient will report no pain greater than 1-2/10 with overhead activities to aid in completion of ADLs. Status at IE:5/10 Current: 
  
                      Patient will increase B UE strength to 5/5 throughout all planes to aid in completion of ADLs. Status at IE: 4/5 in R shoulder, L UE not tested due to precautions Current: 
  
                      Patient will increase FOTO score to 59 points overall to demonstrate improvement in functional status. Status at IE:31 Current: PLAN [x]  Upgrade activities as tolerated     [x]  Continue plan of care 
[]  Update interventions per flow sheet      
[]  Discharge due to:_ 
[]  Other:_ Kristen Sawyer PT 8/2/2018  8:30 AM 
 
Future Appointments Date Time Provider Aquilino Tirado 8/2/2018 4:15 PM Solomon Batista [facility]  
8/6/2018 11:00 AM [address] [place] [address] [facility]  
8/8/2018 2:30 PM Samantha Zee, PT, DPT [facility]  
8/13/2018 11:30 AM Samantha Zee, PT, DPT [facility]  
8/15/2018 11:00 AM Samantha Zee, PT, DPT [facility]  
8/20/2018 10:00 AM Ethan Fox, PT [facility]  
8/23/2018 11:00 AM Ethan Bio, PT [facility]  
8/27/2018 9:00 AM Ethan Bio, PT [facility]  
8/30/2018 2:00 PM Ethan Bio, PT [facility]

## 2018-08-06 ENCOUNTER — APPOINTMENT (OUTPATIENT)
Dept: PHYSICAL THERAPY | Age: 73
End: 2018-08-06
Payer: MEDICARE

## 2018-08-08 ENCOUNTER — HOSPITAL ENCOUNTER (OUTPATIENT)
Dept: PHYSICAL THERAPY | Age: 73
End: 2018-08-08
Payer: MEDICARE

## 2018-08-13 ENCOUNTER — APPOINTMENT (OUTPATIENT)
Dept: PHYSICAL THERAPY | Age: 73
End: 2018-08-13
Payer: MEDICARE

## 2018-08-15 ENCOUNTER — HOSPITAL ENCOUNTER (OUTPATIENT)
Dept: PHYSICAL THERAPY | Age: 73
Discharge: HOME | End: 2018-08-15
Payer: MEDICARE

## 2018-08-15 PROCEDURE — 97110 THERAPEUTIC EXERCISES: CPT | Performed by: PHYSICAL THERAPIST

## 2018-08-15 NOTE — PROGRESS NOTES
PT DAILY TREATMENT NOTE - [facility]     Patient Name: Henry Sal  Date:8/15/2018  : 1945  [x]  Patient  Verified  Payor: Twyla Simmons / Plan: [place] MEDICARE PART A & B / Product Type: Medicare /    In time:11:07  Out time:11:40  Total Treatment Time (min): 33  Total Timed Codes (min): 33  1:1 Treatment Time ( [address] only): 33   Visit #: 3 of 16    Treatment Area: Pain in left arm [M79.602]  Unspecified fracture of upper end of left humerus, initial encounter for closed fracture [S42.202A]    SUBJECTIVE  Pain Level (0-10 scale): 0  Any medication changes, allergies to medications, adverse drug reactions, diagnosis change, or new procedure performed?: [x] No    [] Yes (see summary sheet for update)  Subjective functional status/changes:   [] No changes reported  Feels good. No new issues. Feels almost ready to be discharged     OBJECTIVE    33 min Therapeutic Exercise:  [x] See flow sheet :   Rationale: increase ROM, increase strength and decrease pain to improve the patients ability to complete ADLs          With   [] TE   [] TA   [] neuro   [] other: Patient Education: [x] Review HEP    [] Progressed/Changed HEP based on:   [] positioning   [] body mechanics   [] transfers   [] heat/ice application    [] other:      Other Objective/Functional Measures:   ROM WNL in all planes  MMT: 5/5, except shoulder abd and ER 4+/5     Pain Level (0-10 scale) post treatment: 0    ASSESSMENT/Changes in Function: Patient responds well to treatment session. Patient has made good progress to date. Has minimal pain, ROM that is WNL and strength approaching normal. Advised patient to continue with therapy with reduced frequency for 3 more weeks. Will focus on guidance and progression of HEP. Patent was receptive.  No adverse effects were noted from today's treatment session      Patient will continue to benefit from skilled PT services to modify and progress therapeutic interventions, address functional mobility deficits, address ROM deficits, address strength deficits, analyze and address soft tissue restrictions, analyze and cue movement patterns, analyze and modify body mechanics/ergonomics and assess and modify postural abnormalities to attain remaining goals. []  See Plan of Care  []  See progress note/recertification  []  See Discharge Summary         Progress towards goals / Updated goals:  Short Term Goals: To be accomplished in 2 weeks:                        JTSCIPQ will report compliance with HEP at least 1x/day to aid in rehabilitation program.                        BVEQXJ at IE: NA                        Current: good compliance reported 8-2-18                            Patient will display pain free AROM into 0-165 flexion to aid in completion of ADLs.                       MCAGNM at [address] [place] [address] into 160 degrees                        Current:          Long Term Goals: To be accomplished in 8 weeks:                        YAYLBDS will report compliance with HEP a least 3-4x/week to aid in rehabilitation/strengthening program.                        DFHSTV at [address] [place] [address]:                            Patient will report no pain greater than 1-2/10 with overhead activities to aid in completion of ADLs.                       FQNFJL at IE:5/10                         Current:                            Patient will increase B UE strength to 5/5 throughout all planes to aid in completion of ADLs.                       HJMAHM at IE: 4/5 in R shoulder, L UE not tested due to precautions                        Current:                            Patient will increase FOTO score to 59 points overall to demonstrate improvement in functional status.                          QHPHWI at IE:31                        Current:    PLAN  []  Upgrade activities as tolerated     [x]  Continue plan of care  []  Update interventions per flow sheet       []  Discharge due to:_  []  Other:_      Krystle Ramirez, PT, DPT 8/15/2018  11:17 AM    Future Appointments  Date Time Provider Aquilino Tirado   8/17/2018 8:30 AM Leo Yu, PT [facility]   8/20/2018 10:00 AM Gabrielle Helen, PT [facility]   8/23/2018 11:00 AM Sandston Helen, PT [facility]   8/27/2018 9:00 AM Gabrielle Cervantes, PT [facility]   8/30/2018 2:00 PM Leo Yu, PT [facility]

## 2018-08-17 ENCOUNTER — HOSPITAL ENCOUNTER (OUTPATIENT)
Dept: PHYSICAL THERAPY | Age: 73
Discharge: HOME | End: 2018-08-17
Payer: MEDICARE

## 2018-08-17 PROCEDURE — 97110 THERAPEUTIC EXERCISES: CPT

## 2018-08-17 NOTE — PROGRESS NOTES
PT DAILY TREATMENT NOTE - [facility]     Patient Name: Henry Sal  Date:2018  : 1945  [x]  Patient  Verified  Payor: Twyla Simmons / Plan: [place] MEDICARE PART A & B / Product Type: Medicare /    In time:8:35 Out time:9:13  Total Treatment Time (min): 38  Total Timed Codes (min): 28  1:1 Treatment Time ( only): 25   Visit #: 4 of 16    Treatment Area: Pain in left arm [M79.602]  Unspecified fracture of upper end of left humerus, initial encounter for closed fracture [S42.202A]    SUBJECTIVE  Pain Level (0-10 scale): 0/10   Any medication changes, allergies to medications, adverse drug reactions, diagnosis change, or new procedure performed?: [x] No    [] Yes (see summary sheet for update)  Subjective functional status/changes:   [] No changes reported  Patient stated that she saw the MD and stated everything looks good. Patient stated she was sore from the new exercises with the T-band last session, but the soreness has eased off. Patient had no questions or concerns about the updated home exercises. OBJECTIVE    Modality rationale: decrease pain to improve the patients ability to perform ADLs.     Min Type Additional Details    [] Estim:  []Unatt       []IFC  []Premod                        []Other:  []w/ice   []w/heat  Position:  Location:    [] Estim: []Att    []TENS instruct  []NMES                    []Other:  []w/US   []w/ice   []w/heat  Position:  Location:    []  Traction: [] Cervical       []Lumbar                       [] Prone          []Supine                       []Intermittent   []Continuous Lbs:  [] before manual  [] after manual    []  Ultrasound: []Continuous   [] Pulsed                           []1MHz   []3MHz W/cm2:  Location:    []  Iontophoresis with dexamethasone         Location: [] Take home patch   [] In clinic   10 [x]  Ice     []  heat  []  Ice massage  []  Laser   []  Anodyne Position: Seated  Location: Left shoulder     []  Laser with stim  []  Other: Position:  Location:    []  Vasopneumatic Device Pressure:       [] lo [] med [] hi   Temperature: [] lo [] med [] hi   [] Skin assessment post-treatment:  []intact []redness- no adverse reaction    []redness  adverse reaction:     28 min Therapeutic Exercise:  [x] See flow sheet :   Rationale: increase ROM and increase strength to improve the patients ability to perform ADLs. With   [] TE   [] TA   [] neuro   [] other: Patient Education: [x] Review HEP    [] Progressed/Changed HEP based on:   [] positioning   [] body mechanics   [] transfers   [] heat/ice application    [] other:      Other Objective/Functional Measures:   Patient stated she felt a pop in left shoulder when performing standing cane flexion, but reported no pain and stated it happens all the time from the arthritis. Pain Level (0-10 scale) post treatment: 0/10     ASSESSMENT/Changes in Function: Patient stated the the green t-band with the Rows felt fine and was not too much resistance. Patient was able to perform standing left shoulder AROM into flex and scap with ease and no UT compensation. Patient reported no pain at end of the session. Patient will continue to benefit from skilled PT services to modify and progress therapeutic interventions, address functional mobility deficits, address ROM deficits, address strength deficits, analyze and address soft tissue restrictions, analyze and cue movement patterns and assess and modify postural abnormalities to attain remaining goals.      []  See Plan of Care  []  See progress note/recertification  []  See Discharge Summary         Progress towards goals / Updated goals:  Short Term Goals: To be accomplished in 2 weeks:                        WPUFXKT will report compliance with HEP at least 1x/day to aid in rehabilitation program.                        SCJLOWA at IE: NA                        Current: good compliance reported 8-2-18                            Patient will display pain free AROM into 0-165 flexion to aid in completion of ADLs.                       WMALUS at [address] [place] [address] into 160 degrees                        Current:          Long Term Goals: To be accomplished in 8 weeks:                        HJBWRKC will report compliance with HEP a least 3-4x/week to aid in rehabilitation/strengthening program.                        MUSTAPHA at [address] [place] [address]:                            Patient will report no pain greater than 1-2/10 with overhead activities to aid in completion of ADLs.                       KWRDOG at IE:5/10                         Current:                            Patient will increase B UE strength to 5/5 throughout all planes to aid in completion of ADLs.                       VPOBJY at IE: 4/5 in R shoulder, L UE not tested due to precautions                        Current:                            Patient will increase FOTO score to 59 points overall to demonstrate improvement in functional status.                          GCYIOM at IE:31                        Current:    PLAN  []  Upgrade activities as tolerated     [x]  Continue plan of care  []  Update interventions per flow sheet       []  Discharge due to:_  []  Other:_      Leo Yu PT 8/17/2018  8:36 AM    Future Appointments  Date Time Provider Aquilino Tirado   8/23/2018 11:00 AM Gabrielle Cervantes, TODD LINDA [facility]   8/30/2018 2:00 PM TODD Martinez [facility]

## 2018-08-20 ENCOUNTER — APPOINTMENT (OUTPATIENT)
Dept: PHYSICAL THERAPY | Age: 73
End: 2018-08-20
Payer: MEDICARE

## 2018-08-23 ENCOUNTER — HOSPITAL ENCOUNTER (OUTPATIENT)
Dept: PHYSICAL THERAPY | Age: 73
Discharge: HOME | End: 2018-08-23
Payer: MEDICARE

## 2018-08-23 PROCEDURE — 97530 THERAPEUTIC ACTIVITIES: CPT | Performed by: PHYSICAL THERAPIST

## 2018-08-23 PROCEDURE — 97110 THERAPEUTIC EXERCISES: CPT | Performed by: PHYSICAL THERAPIST

## 2018-08-23 NOTE — PROGRESS NOTES
PT DAILY TREATMENT NOTE - [facility]     Patient Name: Marianne Silva  Date:2018  : 1945  [x]  Patient  Verified  Payor: Loan Flores / Plan: [place] MEDICARE PART A & B / Product Type: Medicare /    In time:1105  Out time:1145  Total Treatment Time (min): 40  Total Timed Codes (min): 40  1:1 Treatment Time ([facility] only): 40   Visit #: 5 of 16     Treatment Area: Pain in left arm [M79.602]  Unspecified fracture of upper end of left humerus, initial encounter for closed fracture [S42.202A]    SUBJECTIVE  Pain Level (0-10 scale): 0  Any medication changes, allergies to medications, adverse drug reactions, diagnosis change, or new procedure performed?: [x] No    [] Yes (see summary sheet for update)  Subjective functional status/changes:   [] No changes reported  Pt feels she is 100% better and feels she is ready for discharge and wishes today to be her last appt   Pt only has some discomfort due to arthritis no pain from injury now   Was difficult to bend arm and lift but now no problems     OBJECTIVE        30 min Therapeutic Exercise:  [x] See flow sheet :   Rationale: increase ROM and increase strength to improve the patients ability to return to PLOF     10 min Therapeutic Activity:  [x]  See flow sheet : shoulder mechanics , posture cues , education on HEP ,    Rationale: increase ROM and improve coordination  to improve the patients ability to return to PLOF            With   [] TE   [] TA   [] neuro   [] other: Patient Education: [x] Review HEP    [] Progressed/Changed HEP based on:   [] positioning   [] body mechanics   [] transfers   [] heat/ice application    [] other:      Other Objective/Functional Measures:   ROM :left shoulder  /175 AROM /PROM ,   deg , ext 70 deg , IR reach to inferior angle scapula = to opp side , ER and CBA = to right shoulder and WNL     MMT :left shoulder : FE 4/5 , ABD 4-/5, 3+ to 4-/5 ER , IR 5/5 ADD 5/5 Ext 5/5  Right shoulder 5/5 all     Pain Level (0-10 scale) post treatment: 0    ASSESSMENT/Changes in Function: following recheck of strength and addition of new tband ex pt aggrees at least one more session to check on response to new ex would be beneficial.      Patient will continue to benefit from skilled PT services to modify and progress therapeutic interventions, address functional mobility deficits, address ROM deficits, address strength deficits, analyze and address soft tissue restrictions, analyze and cue movement patterns, analyze and modify body mechanics/ergonomics and assess and modify postural abnormalities to attain remaining goals. [x]  See Plan of Care  []  See progress note/recertification  []  See Discharge Summary         Progress towards goals / Updated goals:  Short Term Goals: To be accomplished in 2 weeks:                        TLZEDEU will report compliance with HEP at least 1x/day to aid in rehabilitation program.                        GDPAFD at IE: NA                        Current: pt reports doing HEP and no question regarding HEP                             Patient will display pain free AROM into 0-165 flexion to aid in completion of ADLs.                       KKYHYP at [facility] into 160 degrees                        Current:/175 A/PROM,  very close to meeting FE , and currently no pain with AROM           Long Term Goals: To be accomplished in 8 weeks:                        Patient will report compliance with HEP a least 3-4x/week to aid in rehabilitation/strengthening program.                        ULWTZM at IE:NA                        Current:pt performing HEP , given new ex this session will make sure pt is indep with these ex for discharge next session                             Patient will report no pain greater than 1-2/10 with overhead activities to aid in completion of ADLs.                         OFJHMD at IE:5/10                         Current:no pain with reaching overhead into the cupboards 0/10                           Patient will increase B UE strength to 5/5 throughout all planes to aid in completion of ADLs.                       OUPXHD at IE: 4/5 in R shoulder, L UE not tested due to precautions                        Current: FE 4/5, 4-/5 abd , 3+to 4-/5 ER  Left shoulder , right shoulder 5/5 progressing                             Patient will increase FOTO score to 59 points overall to demonstrate improvement in functional status.                          FWHDHQ at IE:31                        Current:will assess for final foto next session     PLAN  [x]  Upgrade activities as tolerated     [x]  Continue plan of care  []  Update interventions per flow sheet       []  Discharge due to:_  []  Other:_      Anisa Titus, PT 8/23/2018  11:15 AM    Future Appointments  Date Time Provider Aquilino Tirado   8/30/2018 2:00 PM TODD Glass [facility]

## 2018-08-27 ENCOUNTER — APPOINTMENT (OUTPATIENT)
Dept: PHYSICAL THERAPY | Age: 73
End: 2018-08-27
Payer: MEDICARE

## 2018-08-30 ENCOUNTER — HOSPITAL ENCOUNTER (OUTPATIENT)
Dept: PHYSICAL THERAPY | Age: 73
End: 2018-08-30
Payer: MEDICARE

## 2019-02-01 NOTE — PROGRESS NOTES
[facility] at [address]: [phone]   Fax: [phone] Discharge Summary Patient Name: Alisha Rockwell : 1945 Medical  
Diagnosis: Pain in left arm [M79.602] Unspecified fracture of upper end of left humerus, initial encounter for closed fracture [S42.202A] Treatment Diagnosis: Left shoulder pain [place] Date: 7/3/2018 Referral Source: Eva Pearson MD Start of Care [facility] [place]): 2019 Prior Hospitalization: See medical history Provider #: [id number] Prior Level of Function: Walking, otherwise sedentary Comorbidities: OA, HTN, hypothyroidism, hx breast CA (10 yrs), depression Medications: Verified on Patient Summary List  
  
=========================================================================================== Assessment / Summary of Care:  Alisha Rockwell is a 68 y.o.  yo female left shoulder pain following left humeral fracture on 7/3/2018. Patient has not returned to clinic in 30 days. Unable to perform formal assessment on patient as a result.  
 
=========================================================================================== 
 
Plan: Discharge to [facility]. Goals: Unable to reassess. G-Codes (GP): NA 
 
 
=========================================================================================== 
Subjective: NA 
 
 
Objective: NA Therapist Signature: Татьяна Fernandez PT, DPT, LEONORA Date: 2019   Time: 11:04 AM

## 2019-04-08 ENCOUNTER — APPOINTMENT (OUTPATIENT)
Dept: PHYSICAL THERAPY | Age: 74
End: 2019-04-08
Payer: MEDICARE

## 2019-04-29 ENCOUNTER — HOSPITAL ENCOUNTER (OUTPATIENT)
Dept: PHYSICAL THERAPY | Age: 74
Discharge: HOME | End: 2019-04-29
Payer: MEDICARE

## 2019-04-29 PROCEDURE — 97110 THERAPEUTIC EXERCISES: CPT

## 2019-04-29 PROCEDURE — 97161 PT EVAL LOW COMPLEX 20 MIN: CPT

## 2019-04-29 NOTE — PROGRESS NOTES
PT DAILY TREATMENT NOTE/KNEE EVAL 10-18 Patient Name: Azalia Duran Date:2019 : 1945 [x]  Patient  Verified Payor: [place] MEDICARE / Plan: Adria Nuñez y / Product Type: Medicare / In time:230  Out time:330 Total Treatment Time (min): 60 Visit #: 1 of 24 Medicare/BCBS Only Total Timed Codes (min):  25 1:1 Treatment Time:  35 Treatment Area: Pain in left knee [M25.562] SUBJECTIVE Pain Level (0-10 scale): 4 
[]constant []intermittent []improving []worsening []no change since onset Any medication changes, allergies to medications, adverse drug reactions, diagnosis change, or new procedure performed?: [x] No    [] Yes (see summary sheet for update) Subjective functional status/changes:    
Patient presents s/p left total knee arthroplasty performed on 2019. Patient describes pain as sharp ache. Pain is worse in AM. Denies numbness/tingling. Denies popping/clicking. Aggravating factors:weight bearing, walking, standing, sleeping. Alleviating factors: rest, sleeping on her side. Denies red flags: SOB, chest pain, dizziness/lightheadedness, blurred/double vision, HA, chills/fevers, night sweats, change in bowel/bladder control, abdominal pain, difficulty swallowing, slurred speech, unexplained weight gain/loss, nausea, vomiting. PMHx: Thyroidism, osteopenia, Osteoarthritis, High blood pressure, hx of breast cancer. Surgical Hx: Right TKA, Mastectomy on right. Social Hx: Lives with  and grandson in two story home, occasional alcohol, work status: retired. PLOF: [employer], cooking. CLOF: Limited by knee pain weight bearing tolerance. OBJECTIVE/EXAMINATION 
 
 
35 min [x]Eval                  []Re-Eval  
 
 
25 min Therapeutic Exercise:  [x] See flow sheet :  
Rationale: increase ROM, increase strength and decrease pain to improve the patients ability to complete ADLs With 
 [x] TE 
 [] TA 
 [] neuro [] other: Patient Education: [x] Review HEP [] Progressed/Changed HEP based on:  
[] positioning   [] body mechanics   [] transfers   [] heat/ice application   
[] other:   
 
 
Physical Therapy Evaluation - Knee Observation: surgical wound healing well no signs of infection/No signs or symptoms of DVT Gait:  [] Normal    [] Abnormal    [x] Antalgic    [] NWB    Device:None Describe: Patient ambulated with independence demonstrating a antalgic gait pattern demonstrating decreased stance time on the left and decreased step length on the right. ROM / Strength 
[] Unable to assess                  AROM                  Strength (1-5) Left Right Left Right Hip Flexion   4- 4+ Extension   3+ 4 Abduction   4 4+ Adduction   4 4 Knee Flexion 106 WFL 3+ 4+ Extension 7 WFL 3+ 5 Ankle Plantarflexion  WFL 4 4 Dorsiflexion  WFL 4 4 Palpation: 
 Neg/Pos  Neg/Pos  Neg/Pos Joint Line Pos Quad tendon Neg Patellar ligament Neg Patella Pos Fibular head Neg Pes Anserinus Neg Tibial tubercle Neg Hamstring tendons Pos Infrapatellar fat pad Neg Optional Tests: 
Patellar Mobility 
 []L []R Hypermobile [x]L []R Hypomobile Other tests/comments: 
Requires UEA to perform single leg stance Pain Level (0-10 scale) post treatment: 4 
 
ASSESSMENT/Changes in Function: Patient presents s/p left total knee arthroplasty performed on March 19, 2019. She has reduced BLE strength. She has reduced balance. Patient has decreased right knee ROM. Patient ambulated with independence demonstrating a antalgic gait pattern demonstrating decreased stance time on the left and decreased step length on the right. Patient's symptoms are consistent s/p total knee arthroplasty.  Patient will benefit from skilled PT services to modify and progress therapeutic interventions, address functional mobility deficits, address ROM deficits, address strength deficits, analyze and address soft tissue restrictions, analyze and cue movement patterns, analyze and modify body mechanics/ergonomics and assess and modify postural abnormalities to attain her goals. []  See Plan of Care 
[]  See progress note/recertification 
[]  See Discharge Summary Progress towards goals / Updated goals: 
See POC PLAN 
[]  Upgrade activities as tolerated     [x]  Continue plan of care 
[]  Update interventions per flow sheet      
[]  Discharge due to:_ 
[]  Other:_   
 
Corky Green PT, DPT 4/29/2019  2:30 PM

## 2019-04-30 NOTE — PROGRESS NOTES
[facility] at [address] [place] [address] Drive: [phone]   Fax: [phone] PLAN OF CARE / STATEMENT OF MEDICAL NECESSITY FOR PHYSICAL THERAPY SERVICES Patient Name: Alisha Rockwell : 1945 Medical  
Diagnosis: Pain in left knee [M25.562] Treatment Diagnosis: Left Knee Pain Onset Date: 2019 Referral Source: Eva Pearson MD Start of Care [facility]): 2019 Prior Hospitalization: See medical history Provider #: [id number] Prior Level of Function: [employer], cooking, active Comorbidities: Thyroidism, Osteopenia, Osteoarthritis, High blood pressure, Hx of breast cancer Medications: Verified on Patient Summary List  
The Plan of Care and following information is based on the information from the initial evaluation.  
=========================================================================================== Assessment / key information:  Alisha Rockwell is a 69 y.o. female presents s/p left total knee arthroplasty performed on 2019. She has reduced BLE strength. She has reduced balance. Patient has decreased right knee ROM. Patient ambulated with independence demonstrating a antalgic gait pattern demonstrating decreased stance time on the left and decreased step length on the right. Patient's symptoms are consistent s/p total knee arthroplasty. Patient will benefit from skilled PT services to modify and progress therapeutic interventions, address functional mobility deficits, address ROM deficits, address strength deficits, analyze and address soft tissue restrictions, analyze and cue movement patterns, analyze and modify body mechanics/ergonomics and assess and modify postural abnormalities to attain her goals. 
=========================================================================================== Eval Complexity: History MEDIUM  Complexity : 1-2 comorbidities / personal factors will impact the outcome/ POC ;  Examination  LOW Complexity : 1-2 Standardized tests and measures addressing body structure, function, activity limitation and / or participation in recreation ; Presentation LOW Complexity : Stable, uncomplicated ;  Decision Making MEDIUM Complexity : FOTO score of 26-74; Overall Complexity LOW Problem List: pain affecting function, decrease ROM, decrease strength, edema affecting function, impaired gait/ balance, decrease ADL/ functional abilitiies, decrease activity tolerance, decrease flexibility/ joint mobility and decrease transfer abilities Treatment Plan may include any combination of the following: Therapeutic exercise, Therapeutic activities, Neuromuscular re-education, Physical agent/modality, Gait/balance training, Manual therapy, Aquatic therapy, Patient education, Self Care training, Functional mobility training, Home safety training and Stair training Patient / Family readiness to learn indicated by: asking questions, trying to perform skills and interestPersons(s) to be included in education: patient (P) Barriers to Learning/Limitations: no 
Measures taken if barriers to learning:  
Patient Goal (s): Restore normal or close to normal movement Patient self reported health status: good Rehabilitation Potential: good Goals: 
Short Term Goals: To be accomplished in 2 weeks: 
 Patient will report compliance with HEP at least 1x/day to aid in rehabilitation program. 
 Status at IE:NA Current Same as IE: 
 
 Patient will demonstrate knee AROM of 5-115 degrees to aid in completion of ADLs. Status at IE:7-106 Current:Same as IE 
 
  
Long Term Goals: To be accomplished in 4 weeks: 
 Patient will increase strength to 4+/5 throughout B LEs to aid in completion of ADLs. Status at IE:3+/5 Current:Same as IE Patient will report pain less than 1-2/10 average to aid in completion of ADLs. Status at IE:4/10  Current:Same as IE 
 
 Patient will perform 10 pain free kettle bell squats with 10lbs with good form/technique to aid in completion of ADLs. Status at IE: 
 Current: 
 
Patient will improve FOTO to 64 points overall to demonstrate improvement in functional ability. Status at IE: 55 
 Current:Same as IE Frequency / Duration:   Patient to be seen 3  times per week for 12  weeks: 
Patient / Caregiver education and instruction: self care and exercises Yolanda Ramirez Therapist Signature: Arlene Keane PT, DPT Date: 4/29/2019 Certification Period: 04/29/2019 - 07/28/2019 Time: 8:03 PM  
=========================================================================================== I certify that the above Physical Therapy Services are being furnished while the patient is under my care. I agree with the treatment plan and certify that this therapy is necessary. Physician Signature:       Date:      Time:  Please sign and return to [facility] or you may fax the signed copy to [phone]. Thank you.

## 2019-05-01 ENCOUNTER — APPOINTMENT (OUTPATIENT)
Dept: PHYSICAL THERAPY | Age: 74
End: 2019-05-01
Payer: MEDICARE

## 2019-05-02 ENCOUNTER — HOSPITAL ENCOUNTER (OUTPATIENT)
Dept: PHYSICAL THERAPY | Age: 74
Discharge: HOME | End: 2019-05-02
Payer: MEDICARE

## 2019-05-02 PROCEDURE — 97110 THERAPEUTIC EXERCISES: CPT

## 2019-05-02 PROCEDURE — 97016 VASOPNEUMATIC DEVICE THERAPY: CPT

## 2019-05-02 NOTE — PROGRESS NOTES
PT DAILY TREATMENT NOTE - [facility]  Patient Name: Zackery Elaine Date:2019 : 1945 [x]  Patient  Verified Payor: [place] MEDICARE / Plan: Adria Correia / Product Type: Medicare / In time:940  Out time:1010 Total Treatment Time (min): 30 Total Timed Codes (min): 20  
1:1 Treatment Time ( only): 20 Visit #: 2 of 24 Treatment Area: Pain in left knee [M25.562] SUBJECTIVE Pain Level (0-10 scale): 4 Any medication changes, allergies to medications, adverse drug reactions, diagnosis change, or new procedure performed?: [x] No    [] Yes (see summary sheet for update) Subjective functional status/changes:   [] No changes reported Patient reports compliance with HEP. OBJECTIVE Modality rationale: decrease edema, decrease pain and increase tissue extensibility to improve the patients ability to improve knee mobility Type Additional Details [x]  Vasopneumatic Device 10 min Pressure:       [] lo [x] med [] hi  
Temperature: [x] lo [] med [] hi  
[x] Skin assessment post-treatment:  [x]intact [x]redness- no adverse reaction 
  []redness  adverse reaction:  
 
20 min Therapeutic Exercise:  [] See flow sheet :  
Rationale: increase ROM, increase strength and decrease pain to improve the patients ability to complete ADLs With 
 [x] TE 
 [] TA 
 [] neuro 
 [] other: Patient Education: [x] Review HEP [] Progressed/Changed HEP based on:  
[] positioning   [] body mechanics   [] transfers   [] heat/ice application   
[] other:   
 
Other Objective/Functional Measures:   
 
Pain Level (0-10 scale) post treatment: 0 
 
ASSESSMENT/Changes in Function: Patient responds well to treatment session. Patient required cues to avoid vaulting when performing step up activities. No adverse effects were noted from today's treatment session Patient will continue to benefit from skilled PT services to modify and progress therapeutic interventions, address functional mobility deficits, address ROM deficits, address strength deficits, analyze and address soft tissue restrictions, analyze and cue movement patterns, analyze and modify body mechanics/ergonomics, assess and modify postural abnormalities, instruct in home and community integration to attain remaining goals. []  See Plan of Care 
[]  See progress note/recertification 
[]  See Discharge Summary Progress towards goals / Updated goals: 
Short Term Goals: To be accomplished in 2 weeks: 
               Patient will report compliance with HEP at least 1x/day to aid in rehabilitation program. 
               Status at IE:NA Current: Reports compliance with initial HEP 05/02/2019 
  
               Patient will demonstrate knee AROM of 5-115 degrees to aid in completion of ADLs. Status at IE:7-106 Current:Same as IE 
  
  
Long Term Goals: To be accomplished in 4 weeks: 
               Patient will increase strength to 4+/5 throughout B LEs to aid in completion of ADLs. Status at IE:3+/5 Current:Same as IE 
  
               Patient will report pain less than 1-2/10 average to aid in completion of ADLs. Status at IE:4/10 Current:Same as IE 
  
               Patient will perform 10 pain free kettle bell squats with 10lbs with good form/technique to aid in completion of ADLs. Status at IE: 
               Current: 
  
Patient will improve FOTO to 64 points overall to demonstrate improvement in functional ability. Status at IE: 55 
               Current:Same as IE 
 
PLAN 
[]  Upgrade activities as tolerated     [x]  Continue plan of care 
[]  Update interventions per flow sheet      
[]  Discharge due to:_ 
[]  Other:_   
 
Akash Fall, PT, DPT 5/2/2019  9:48 AM 
 
Future Appointments Date Time Provider Aquilino Tirado 5/7/2019 10:00 AM Aretta Raring, PT [facility]  
5/8/2019  2:00 PM Aretta Raring, PT [facility]  
5/10/2019 10:30 AM Yury Guy, PT, DPT [facility]  
5/14/2019  2:00 PM Aretta Raring, PT [facility]  
5/15/2019  2:30 PM Yury Guy, PT, DPT [facility]  
5/17/2019  3:00 PM Yury Guy, PT, DPT [facility]  
5/21/2019  1:00 PM Aretta Raring, PT [facility]  
5/23/2019  2:00 PM Aretta Raring, PT [facility]  
5/28/2019  2:30 PM Aretta Raring, PT [facility]  
5/30/2019  2:30 PM Aretta Raring, PT [facility]  
6/4/2019  1:00 PM Aretta Raring, PT [facility]  
6/6/2019  2:00 PM Aretta Raring, PT [facility]

## 2019-05-07 ENCOUNTER — HOSPITAL ENCOUNTER (OUTPATIENT)
Dept: PHYSICAL THERAPY | Age: 74
Discharge: HOME | End: 2019-05-07
Payer: MEDICARE

## 2019-05-07 PROCEDURE — 97016 VASOPNEUMATIC DEVICE THERAPY: CPT

## 2019-05-07 PROCEDURE — 97110 THERAPEUTIC EXERCISES: CPT

## 2019-05-07 NOTE — PROGRESS NOTES
PT DAILY TREATMENT NOTE - [facility]  Patient Name: Yas Hill Date:2019 : 1945 [x]  Patient  Verified Payor: [place] MEDICARE / Plan: Adria Nuñez [facility] / Product Type: Medicare / In time:1000  Out time:1045 Total Treatment Time (min): 45 Total Timed Codes (min): 30  
1:1 Treatment Time ( only): 30 Visit #: 3 of 24 Treatment Area: Pain in left knee [M25.562] SUBJECTIVE Pain Level (0-10 scale): 0 Any medication changes, allergies to medications, adverse drug reactions, diagnosis change, or new procedure performed?: [x] No    [] Yes (see summary sheet for update) Subjective functional status/changes:   [] No changes reported Patient reports that she had a rough day yesterday as she had a lot of swelling. She states that she was on her feet a lot at [employer] [facility]  [address] OBJECTIVE Modality rationale: 
15 min decrease edema and increase tissue extensibility to improve the patients ability to to improve gait mechanics Type Additional Details [x]  Vasopneumatic Device Pressure:       [] lo [x] med [] hi  
Temperature: [x] lo [] med [] hi  
[x] Skin assessment post-treatment:  [x]intact [x]redness- no adverse reaction 
  []redness  adverse reaction:  
  
30 min Therapeutic Exercise:  [x] See flow sheet :  
Rationale: increase ROM, increase strength and decrease pain to improve the patients ability to complete ADLs With 
 [] TE 
 [] TA 
 [] neuro 
 [] other: Patient Education: [x] Review HEP [] Progressed/Changed HEP based on:  
[] positioning   [] body mechanics   [] transfers   [] heat/ice application   
[] other:   
 
Other Objective/Functional Measures:   
  
Pain Level (0-10 scale) post treatment: 0 
 
ASSESSMENT/Changes in Function: Patient responds well to treatment session. Patient required cues to focus on quad control with step activities.  Provided block on the right during sit to stand activity to promote weight bearing through the left lower extremity. No adverse effects were noted from today's treatment session Patient will continue to benefit from skilled PT services to modify and progress therapeutic interventions, address functional mobility deficits, address ROM deficits, address strength deficits, analyze and address soft tissue restrictions, analyze and cue movement patterns, analyze and modify body mechanics/ergonomics, assess and modify postural abnormalities, instruct in home and community integration to attain remaining goals. []  See Plan of Care 
[]  See progress note/recertification 
[]  See Discharge Summary Progress towards goals / Updated goals: 
Short Term Goals: To be accomplished in 2 weeks: 
               VASAMSONDB will report compliance with HEP at least 1x/day to aid in rehabilitation program. 
               Status at IE:NA 
               BCZOVGV: Reports compliance with initial HEP 05/02/2019 
  
               Patient will demonstrate knee AROM of 5-115 degrees to aid in completion of ADLs.                Status at IE:7-106 
               Current:Same as IE 
  
Long Term Goals: To be accomplished in 4 weeks: 
               ZNIOHHB will increase strength to 4+/5 throughout B LEs to aid in completion of ADLs.                Status at IE:3+/5 
               Current:Same as IE 
  
               Patient will report pain less than 1-2/10 average to aid in completion of ADLs.                Status at IE:4/10 
               Current:Same as IE 
  
               Patient will perform 10 pain free kettle bell squats with 10lbs with good form/technique to aid in completion of ADLs.                Status at IE: 
               CANDIDAHN: 
  
Patient will improve FOTO to 64 points overall to demonstrate improvement in functional ability.  
               Status at IE: 55 
               Current:Same as IE 
 
PLAN 
 []  Upgrade activities as tolerated     [x]  Continue plan of care 
[]  Update interventions per flow sheet      
[]  Discharge due to:_ 
[]  Other:_   
 
Vira Mcneal PT, DPT 5/7/2019  10:02 AM 
 
Future Appointments Date Time Provider Aquilino Tirado 5/8/2019  2:00 PM Kurtis Torres, PT [facility]  
5/10/2019 10:30 AM Miguel Hamilton PT, DPT [facility]  
5/14/2019  2:00 PM Kurtis Torres, PT [facility]  
5/15/2019  2:30 PM Miguel Hamilton, PT, DPT [facility]  
5/17/2019  3:00 PM Miguel Hamilton PT, DPT [facility]  
5/21/2019  1:00 PM Kurtis Jews, PT [facility]  
5/23/2019  2:00 PM Kurtis Jews, PT [facility]  
5/28/2019  2:30 PM Kurtis Jews, PT [facility]  
5/30/2019  2:30 PM Kurtis Jews, PT [facility]  
6/4/2019  1:00 PM Kurtis Jews, PT [facility]  
6/6/2019  2:00 PM Kurtis Jews, PT [facility]

## 2019-05-08 ENCOUNTER — APPOINTMENT (OUTPATIENT)
Dept: PHYSICAL THERAPY | Age: 74
End: 2019-05-08
Payer: MEDICARE

## 2019-05-10 ENCOUNTER — HOSPITAL ENCOUNTER (OUTPATIENT)
Dept: PHYSICAL THERAPY | Age: 74
Discharge: HOME | End: 2019-05-10
Payer: MEDICARE

## 2019-05-10 PROCEDURE — 97016 VASOPNEUMATIC DEVICE THERAPY: CPT | Performed by: PHYSICAL THERAPIST

## 2019-05-10 PROCEDURE — 97110 THERAPEUTIC EXERCISES: CPT | Performed by: PHYSICAL THERAPIST

## 2019-05-10 NOTE — PROGRESS NOTES
PT DAILY TREATMENT NOTE Patient Name: Landen Purvis Date:5/10/2019 : 1945 [x]  Patient  Verified Payor: [place] MEDICARE / Plan: Adria Nuñez y / Product Type: Medicare / In time:10:30  Out time:11:22 Total Treatment Time (min): 52 Total Timed Codes (min): 42 
1:1 Treatment Time ( only): 42 Visit #: 3 of 24 Treatment Area: Pain in left knee [M25.562] SUBJECTIVE Pain Level (0-10 scale): 0 Any medication changes, allergies to medications, adverse drug reactions, diagnosis change, or new procedure performed?: [x] No    [] Yes (see summary sheet for update) Subjective functional status/changes:   [] No changes reported Feels good. The knee felt like it was almost normal yesterday OBJECTIVE Modalities Rationale:     decrease edema and decrease pain to improve patient's ability to Complete ADLS 
10 min []  Vasopneumatic Device, press/temp: Med pressure, low temp  
 min []  Other:   
[] Skin assessment post-treatment (if applicable):   
[]  intact    []  redness- no adverse reaction    
[]redness  adverse reaction:     
 
42 min Therapeutic Exercise:  [x] See flow sheet :  
Rationale: increase ROM, increase strength and decrease pain to improve the patients ability to complete ADLs With 
 [] TE 
 [] TA 
 [] neuro 
 [] other: Patient Education: [x] Review HEP [] Progressed/Changed HEP based on:  
[] positioning   [] body mechanics   [] transfers   [] heat/ice application   
[] other:   
 
Other Objective/Functional Measures: NA  
 
Pain Level (0-10 scale) post treatment: 0 
 
ASSESSMENT/Changes in Function: Patient responds well to treatment session. Patient is progressing well. Tolerates increases in exercise intensity with no issues. Progress as tolerated. No adverse effects were noted from today's treatment session Patient will continue to benefit from skilled PT services to modify and progress therapeutic interventions, address functional mobility deficits, address ROM deficits, address strength deficits, analyze and address soft tissue restrictions, analyze and cue movement patterns, analyze and modify body mechanics/ergonomics, assess and modify postural abnormalities []  See Plan of Care 
[]  See progress note/recertification 
[]  See Discharge Summary Progress towards goals / Updated goals: 
Short Term Goals: To be accomplished in 2 weeks: 
               KZPKPKZ will report compliance with HEP at least 1x/day to aid in rehabilitation program. 
               Status at IE:NA 
               OPMVGAI: Reports compliance with initial HEP 05/02/2019 
  
               Patient will demonstrate knee AROM of 5-115 degrees to aid in completion of ADLs.                Status at IE:7-106 
               Current:Same as IE 
  
Long Term Goals: To be accomplished in 4 weeks: 
               HBGGZXB will increase strength to 4+/5 throughout B LEs to aid in completion of ADLs.                Status at IE:3+/5 
               Current:Same as IE 
  
               Patient will report pain less than 1-2/10 average to aid in completion of ADLs.                Status at IE:4/10 
               Current:Same as IE 
  
               Patient will perform 10 pain free kettle bell squats with 10lbs with good form/technique to aid in completion of ADLs.                Status at IE: 
               OHZXUOC: 
  
Patient will improve FOTO to 64 points overall to demonstrate improvement in functional ability.                Status at IE: 55 
               Current:Same as IE 
 
 
 
PLAN 
[]  Upgrade activities as tolerated     [x]  Continue plan of care 
[]  Update interventions per flow sheet      
[]  Discharge due to:_ 
[]  Other:_ King Daly PT, DPT 5/10/2019  10:37 AM 
 
Future Appointments Date Time Provider Aquilino Tirado 5/14/2019  2:00 PM TODD Martin [facility]  
5/15/2019  2:30 PM Laura Ramírez PT, DPT [facility]  
 5/17/2019  3:00 PM Nancy Mosqueda, DPT [facility]  
5/21/2019  1:00 PM Hipolito Kawasaki, PT [facility]  
5/23/2019  2:00 PM Raulolito Kawasaki, PT [facility]  
5/28/2019  2:30 PM Raulolito Kawasaki, PT [facility]  
5/30/2019  2:30 PM Raulolito Kawasaki, PT [facility]  
6/4/2019  1:00 PM Hipolito Kawasaki, PT [facility]  
6/6/2019  2:00 PM Hipolito Kawasaki, PT [facility]

## 2019-05-14 ENCOUNTER — HOSPITAL ENCOUNTER (OUTPATIENT)
Dept: PHYSICAL THERAPY | Age: 74
Discharge: HOME | End: 2019-05-14
Payer: MEDICARE

## 2019-05-14 PROCEDURE — 97110 THERAPEUTIC EXERCISES: CPT

## 2019-05-14 PROCEDURE — 97016 VASOPNEUMATIC DEVICE THERAPY: CPT

## 2019-05-14 NOTE — PROGRESS NOTES
PT DAILY TREATMENT NOTE - [facility]  Patient Name: Marianne Silva Date:2019 : 1945 [x]  Patient  Verified Payor: [place] / Plan: Adria Conteh / Product Type: Medicare / In time:200  Out time:250 Total Treatment Time (min): 50 Total Timed Codes (min): 30  
1:1 Treatment Time ( only): 30 Visit #: 4 of 24 Treatment Area: Pain in left knee [M25.562] SUBJECTIVE Pain Level (0-10 scale): 0 Any medication changes, allergies to medications, adverse drug reactions, diagnosis change, or new procedure performed?: [x] No    [] Yes (see summary sheet for update) Subjective functional status/changes:   [] No changes reported Patient reports that she was very sore following SLR last visit and felt that the weight was too much and would like to decrease resistance. OBJECTIVE Modality rationale: 
10 minutes decrease edema and increase tissue extensibility to improve the patients ability to improve joint mechanics Type Additional Details [x]  Vasopneumatic Device Pressure:       [] lo [x] med [] hi  
Temperature: [x] lo [] med [] hi  
[x] Skin assessment post-treatment:  [x]intact [x]redness- no adverse reaction 
  []redness  adverse reaction:  
 
30 min Therapeutic Exercise:  [x] See flow sheet :  
Rationale: increase ROM, increase strength and decrease pain to improve the patients ability to complete ADLs With 
 [x] TE 
 [] TA 
 [] neuro 
 [] other: Patient Education: [x] Review HEP [] Progressed/Changed HEP based on:  
[] positioning   [] body mechanics   [] transfers   [] heat/ice application   
[] other:   
 
Other Objective/Functional Measures:   
 
Pain Level (0-10 scale) post treatment: 0 
 
ASSESSMENT/Changes in Function: Patient responds well to treatment session. Patient required cues for sequencing and activity pacing with step activities.  Provided several cues throughout treatment to focus on quad control. No adverse effects were noted from today's treatment session Patient will continue to benefit from skilled PT services to modify and progress therapeutic interventions, address functional mobility deficits, address ROM deficits, address strength deficits, analyze and address soft tissue restrictions, analyze and cue movement patterns, analyze and modify body mechanics/ergonomics, assess and modify postural abnormalities, address imbalance and instruct in home and community integration to attain remaining goals. []  See Plan of Care 
[]  See progress note/recertification 
[]  See Discharge Summary Progress towards goals / Updated goals: 
Short Term Goals: To be accomplished in 2 weeks: 
               DALEIYP will report compliance with HEP at least 1x/day to aid in rehabilitation program. 
               Status at IE:NA 
               SCOTT: Reports compliance with initial HEP 05/02/2019 
  
               Patient will demonstrate knee AROM of 5-115 degrees to aid in completion of ADLs.                Status at IE:7-106 
               Current:Same as IE 
  
Long Term Goals: To be accomplished in 4 weeks: 
               QCHYCTB will increase strength to 4+/5 throughout B LEs to aid in completion of ADLs.                Status at IE:3+/5 
               Current:Same as IE 
  
               Patient will report pain less than 1-2/10 average to aid in completion of ADLs.                Status at IE:4/10 
               Current:Same as IE 
  
               Patient will perform 10 pain free kettle bell squats with 10lbs with good form/technique to aid in completion of ADLs.                Status at IE: 
               ZXPXVTB: 
  
Patient will improve FOTO to 64 points overall to demonstrate improvement in functional ability.                Status at IE: 55 
               Current:Same as IE 
 
PLAN 
[]  Upgrade activities as tolerated     [x]  Continue plan of care []  Update interventions per flow sheet      
[]  Discharge due to:_ 
[]  Other:_   
 
Ger Franklin, PT, DPT 5/14/2019  2:02 PM 
 
Future Appointments Date Time Provider Aquilino Tirado 5/15/2019  2:30 PM Tri Falling, PT, DPT [facility]  
5/17/2019  3:00 PM Tri Falling, [address], DPT [facility]  
5/21/2019  1:00 PM Dartha Clamp, PT [facility]  
5/23/2019  2:00 PM Dartha Clamp, PT [facility]  
5/28/2019  2:30 PM Dartha Clamp, PT [facility]  
5/30/2019  2:30 PM Dartha Clamp, PT [facility]  
6/4/2019  1:00 PM Dartha Clamp, PT [facility]  
6/6/2019  2:00 PM Dartha Clamp, PT [facility]

## 2019-05-15 ENCOUNTER — HOSPITAL ENCOUNTER (OUTPATIENT)
Dept: PHYSICAL THERAPY | Age: 74
Discharge: HOME | End: 2019-05-15
Payer: MEDICARE

## 2019-05-15 PROCEDURE — 97016 VASOPNEUMATIC DEVICE THERAPY: CPT | Performed by: PHYSICAL THERAPIST

## 2019-05-15 PROCEDURE — 97110 THERAPEUTIC EXERCISES: CPT | Performed by: PHYSICAL THERAPIST

## 2019-05-15 NOTE — PROGRESS NOTES
PT DAILY TREATMENT NOTE Patient Name: Levy  Date:5/15/2019 : 1945 [x]  Patient  Verified Payor: [place] MEDICARE / Plan: Adria Correiay / Product Type: Medicare / In time:2:30  Out time:3:14 Total Treatment Time (min): 44 Total Timed Codes (min): 44 
1:1 Treatment Time ( only): 34 Visit #: 6 of 24 Treatment Area: Pain in left knee [M25.562] SUBJECTIVE Pain Level (0-10 scale): 3 Any medication changes, allergies to medications, adverse drug reactions, diagnosis change, or new procedure performed?: [x] No    [] Yes (see summary sheet for update) Subjective functional status/changes:   [] No changes reported Feels alright. The intensity was still OBJECTIVE Modalities Rationale:     decrease edema and decrease pain to improve patient's ability to Complete ADLS 
10 min []  Vasopneumatic Device, press/temp: Mod pressure, low temp  
 min []  Other:   
[] Skin assessment post-treatment (if applicable):   
[]  intact    []  redness- no adverse reaction    
[]redness  adverse reaction:     
 
34 min Therapeutic Exercise:  [x] See flow sheet :  
Rationale: increase ROM, increase strength and decrease pain to improve the patients ability to complete ADLs With 
 [] TE 
 [] TA 
 [] neuro 
 [] other: Patient Education: [x] Review HEP [] Progressed/Changed HEP based on:  
[] positioning   [] body mechanics   [] transfers   [] heat/ice application   
[] other:   
 
Other Objective/Functional Measures: Flexion 100degrees Pain Level (0-10 scale) post treatment: 3 
 
ASSESSMENT/Changes in Function: Patient responds well to treatment session. pateint is progressing well. Knee flexion AROM is increasing to 100degress. Progress as tolerated. No adverse effects were noted from today's treatment session Patient will continue to benefit from skilled PT services to modify and progress therapeutic interventions, address functional mobility deficits, address ROM deficits, address strength deficits, analyze and address soft tissue restrictions, analyze and cue movement patterns, analyze and modify body mechanics/ergonomics, assess and modify postural abnormalities []  See Plan of Care 
[]  See progress note/recertification 
[]  See Discharge Summary Progress towards goals / Updated goals: 
Short Term Goals: To be accomplished in 2 weeks: 
               YVFVWUN will report compliance with HEP at least 1x/day to aid in rehabilitation program. 
               Status at IE:NA 
               TCESCTD: Reports compliance with initial HEP 05/02/2019 
  
               Patient will demonstrate knee AROM of 5-115 degrees to aid in completion of ADLs.                Status at IE:7-106 
               Current:Same as IE 
  
Long Term Goals: To be accomplished in 4 weeks: 
               MDJOUVC will increase strength to 4+/5 throughout B LEs to aid in completion of ADLs.                Status at IE:3+/5 
               Current:Same as IE 
  
               Patient will report pain less than 1-2/10 average to aid in completion of ADLs.                Status at IE:4/10 
               Current:Same as IE 
  
               Patient will perform 10 pain free kettle bell squats with 10lbs with good form/technique to aid in completion of ADLs.                Status at IE: 
               AWKTMSI: 
  
Patient will improve FOTO to 64 points overall to demonstrate improvement in functional ability.                Status at IE: 55 
               Current:Same as IE 
 
 
 
PLAN 
[]  Upgrade activities as tolerated     [x]  Continue plan of care 
[]  Update interventions per flow sheet      
[]  Discharge due to:_ 
[]  Other:_ Darlyn Cobos PT, DPT 5/15/2019  3:04 PM 
 
Future Appointments Date Time Provider Aquilino Tirado 5/17/2019  3:00 PM Isaac Agarwal PT, DPT [facility]  
5/21/2019  1:00 PM TODD Serrato [facility]  
 5/23/2019  2:00 PM TODD [facility]  
5/28/2019  2:30 PM TODD [facility]  
5/30/2019  2:30 PM [facility]D [facility]  
6/4/2019  1:00 PM Geovany Viveros PT [facility]  
6/6/2019  2:00 PM TODD [facility]  
 
 
 
 
\

## 2019-05-17 ENCOUNTER — APPOINTMENT (OUTPATIENT)
Dept: PHYSICAL THERAPY | Age: 74
End: 2019-05-17
Payer: MEDICARE

## 2019-05-21 ENCOUNTER — APPOINTMENT (OUTPATIENT)
Dept: PHYSICAL THERAPY | Age: 74
End: 2019-05-21
Payer: MEDICARE

## 2019-05-23 ENCOUNTER — APPOINTMENT (OUTPATIENT)
Dept: PHYSICAL THERAPY | Age: 74
End: 2019-05-23
Payer: MEDICARE

## 2019-05-28 ENCOUNTER — HOSPITAL ENCOUNTER (OUTPATIENT)
Dept: PHYSICAL THERAPY | Age: 74
Discharge: HOME | End: 2019-05-28
Payer: MEDICARE

## 2019-05-28 PROCEDURE — 97110 THERAPEUTIC EXERCISES: CPT

## 2019-05-28 PROCEDURE — 97016 VASOPNEUMATIC DEVICE THERAPY: CPT

## 2019-05-28 NOTE — PROGRESS NOTES
[facility] at [address] [place] [address] Drive: [phone]   Fax: [phone]  Progress Note  Patient Name: Alisha Rockwell : 1945   Medical   Diagnosis: Pain in left knee [M25.562] Treatment Diagnosis: Left Knee Pain   Onset Date: 3/19/2019     Referral Source: Eva Pearson MD Start of Care [facility]): 2019   Prior Hospitalization: See medical history Provider #: [id number]   Prior Level of Function: House Keeping, cooking, active   Comorbidities: Thyroidism, Osteopenia, Osteoarthritis, High blood pressure, Hx of breast cancer   Medications: Verified on Patient Summary List      ===========================================================================================  Assessment / Summary of Care:  Alisha Rockwell is a 69 y.o. female s/p left total knee arthroplasty performed on 2019. She has reduced BLE strength. Patient is improving as she has met 1/2 STGs and is progressing toward her remaining goals. Patient has made gains in strength and ROM. Patient activity tolerance is improving but she is limited at time secondary to edema.  Patient will continue to benefit from skilled PT services to modify and progress therapeutic interventions, address functional mobility deficits, address ROM deficits, address strength deficits, analyze and address soft tissue restrictions, analyze and cue movement patterns, analyze and modify body mechanics/ergonomics, assess and modify postural abnormalities, instruct in home and community integration to attain remaining goals.     ===========================================================================================    Plan:Continue therapy per initial plan/protocol at a frequency of  2 x per week for 12 weeks    Goals:   Short Term Goals: To be accomplished in 2 weeks:                 Patient will report compliance with HEP at least 1x/day to aid in rehabilitation program.                 Status at IE:NA                 TXPOZAM: Met 05/28/2019                    LAVERN will demonstrate knee AROM of 5-115 degrees to aid in completion of ADLs.                Status at IE:7-106                  Current: In-progress 2-110 05/28/2019     Long Term Goals: To be accomplished in 4 weeks:                 GPRZYGP will increase strength to 4+/5 throughout B LEs to aid in completion of ADLs.                Status at IE:3+/5                 Current: In-progress 4/5 05/28/2019                    Patient will report pain less than 1-2/10 average to aid in completion of ADLs.                Status at IE:4/10                 Current:In-Progress 0-4 with ADLs 05/28/2019                    Patient will perform 10 pain free kettle bell squats with 10lbs with good form/technique to aid in completion of ADLs.                Status at IE:                 LHZGFGH: In-progress 10 sit to stands with 3/10 pain 05/28/2019     Patient will improve FOTO to 64 points overall to demonstrate improvement in functional ability.                Status at IE: 55                 Current: In-progress 69 05/28/2019     ===========================================================================================  Subjective: Patient reports that she is sore and stiff today due to swelling in her left knee. Objective:   FOTO 69  ROM 2-110  4/5 MMT    Therapist Signature: Rasheeda Frances PT, DPT Date: 9/24/1693   Re-Certification:  Time: 2:84 PM       I certify that the above Therapy Services are being furnished while the patient is under my care. I agree with the treatment plan and certify that this therapy is necessary. [] I have read the above and request that my patient continue as recommended.   [] I have read the above report and request that my patient continue therapy with the following changes/special instructions: ______________________________________  [] I have read the above report and request that my patient be discharged from therapy    Physician's Signature:____________Date:_________TIME:________    ** Signature, Date and Time must be completed for valid certification **    [facility] at [address]   Phone: [phone]   Fax: [phone]

## 2019-05-28 NOTE — PROGRESS NOTES
PT DAILY TREATMENT NOTE - [facility]     Patient Name: Makenna Kerns  Date:2019  : 1945  [x]  Patient  Verified  Payor: Dayna Boyd / Plan: [place] MEDICARE PART A & B / Product Type: Medicare /    In time:230  Out time:325  Total Treatment Time (min): 55  Total Timed Codes (min): 30   1:1 Treatment Time ([address] only): 30   Visit #: 7 of 24    Treatment Area: Pain in left knee [M25.562]    SUBJECTIVE  Pain Level (0-10 scale): 4  Any medication changes, allergies to medications, adverse drug reactions, diagnosis change, or new procedure performed?: [x] No    [] Yes (see summary sheet for update)  Subjective functional status/changes:   [] No changes reported  Patient reports that she is sore today due to swelling in her left knee. OBJECTIVE    Modalities Rationale:     decrease edema and decrease pain to improve patient's ability to Complete ADLS         10 min [x]  Vasopneumatic Device, press/temp: Mod pressure, low temp     min []  Other:     [x] Skin assessment post-treatment (if applicable):    [x]  intact    [x]  redness- no adverse reaction     []redness  adverse reaction:           30 min Therapeutic Exercise:  [x] See flow sheet :   Rationale: increase ROM, increase strength and decrease pain to improve the patients ability to complete ADLs            With   [x] TE   [] TA   [] neuro   [] other: Patient Education: [x] Review HEP    [] Progressed/Changed HEP based on:   [] positioning   [] body mechanics   [] transfers   [] heat/ice application    [] other:      Other Objective/Functional Measures:   FOTO 69  ROM 2-110  4/5 MMT       Pain Level (0-10 scale) post treatment: 0    ASSESSMENT/Changes in Function: Patient responds well to treatment session. No adverse effects were noted from today's treatment session. Patient is improving as she has met 1/2 STGs and is progressing toward her remaining goals. Patient has made gains in strength and ROM.  Patient activity tolerance is improving but she is limited at time secondary to edema. Patient will continue to benefit from skilled PT services to modify and progress therapeutic interventions, address functional mobility deficits, address ROM deficits, address strength deficits, analyze and address soft tissue restrictions, analyze and cue movement patterns, analyze and modify body mechanics/ergonomics, assess and modify postural abnormalities, instruct in home and community integration to attain remaining goals. []  See Plan of Care  []  See progress note/recertification  []  See Discharge Summary         Progress towards goals / Updated goals:  Short Term Goals: To be accomplished in 2 weeks:                 VEIVJWM will report compliance with HEP at least 1x/day to aid in rehabilitation program.                 Status at IE:NA                 CYBXBCS: Met 05/28/2019                    APGBYGJ will demonstrate knee AROM of 5-115 degrees to aid in completion of ADLs.                Status at IE:7-106                  Current: In-progress 2-110 05/28/2019     Long Term Goals: To be accomplished in 4 weeks:                 INSLSDO will increase strength to 4+/5 throughout B LEs to aid in completion of ADLs.                Status at IE:3+/5                 Current: In-progress 4/5 05/28/2019                    Patient will report pain less than 1-2/10 average to aid in completion of ADLs.                Status at IE:4/10                 Current:In-Progress 0-4 with ADLs 05/28/2019                    Patient will perform 10 pain free kettle bell squats with 10lbs with good form/technique to aid in completion of ADLs.                Status at IE:                 FEWTPHY: In-progress 10 sit to stands with 3/10 pain 05/28/2019     Patient will improve FOTO to 64 points overall to demonstrate improvement in functional ability.                Status at IE: 55                 Current:  In-progress 69 05/28/2019    PLAN  []  Upgrade activities as tolerated     [x] Continue plan of care  []  Update interventions per flow sheet       []  Discharge due to:_  []  Other:_      Tk Ponce, PT, DPT 5/28/2019  2:37 PM    Future Appointments   Date Time Provider Aquilino Tirado   5/30/2019  2:30 PM TODD Macedo [facility]   6/4/2019  1:00 PM TODD Macedo [facility]   6/6/2019  2:00 PM TODD Macedo [facility]

## 2019-05-30 ENCOUNTER — HOSPITAL ENCOUNTER (OUTPATIENT)
Dept: PHYSICAL THERAPY | Age: 74
Discharge: HOME | End: 2019-05-30
Payer: MEDICARE

## 2019-05-30 PROCEDURE — 97110 THERAPEUTIC EXERCISES: CPT

## 2019-05-30 PROCEDURE — 97016 VASOPNEUMATIC DEVICE THERAPY: CPT

## 2019-05-30 NOTE — PROGRESS NOTES
PT DAILY TREATMENT NOTE - [facility]     Patient Name: Suma Holland  Date:2019  : 1945  [x]  Patient  Verified  Payor: Estrella Keller / Plan: [place] MEDICARE PART A & B / Product Type: Medicare /    In time:235  Out time:315  Total Treatment Time (min): 40  Total Timed Codes (min): 30   1:1 Treatment Time ( [address] only): 30   Visit #: 8 of 24    Treatment Area: Pain in left knee [M25.562]    SUBJECTIVE  Pain Level (0-10 scale): 0  Any medication changes, allergies to medications, adverse drug reactions, diagnosis change, or new procedure performed?: [x] No    [] Yes (see summary sheet for update)  Subjective functional status/changes:   [] No changes reported  Patient reports that she had an encouraging follow up with her surgeon and he was pleased with her progress so far. OBJECTIVE    Modality rationale:  10 min decrease edema and increase tissue extensibility to improve the patients ability to improve ambulation   Type Additional Details   [x]  Vasopneumatic Device Pressure:       [] lo [x] med [] hi   Temperature: [x] lo [] med [] hi   [x] Skin assessment post-treatment:  [x]intact [x]redness- no adverse reaction    []redness  adverse reaction:     30 min Therapeutic Exercise:  [x] See flow sheet :   Rationale: increase ROM, increase strength and decrease pain to improve the patients ability to complete ADLs          With   [x] TE   [] TA   [] neuro   [] other: Patient Education: [x] Review HEP    [] Progressed/Changed HEP based on:   [] positioning   [] body mechanics   [] transfers   [] heat/ice application    [] other:      Other Objective/Functional Measures:      Pain Level (0-10 scale) post treatment: 0    ASSESSMENT/Changes in Function: Patient responds well to treatment session. Patient required cues to avoid compensatory strategies during hip strengthening activities.  No adverse effects were noted from today's treatment session      Patient will continue to benefit from skilled PT services to modify and progress therapeutic interventions, address functional mobility deficits, address ROM deficits, address strength deficits, analyze and address soft tissue restrictions, analyze and cue movement patterns, analyze and modify body mechanics/ergonomics, assess and modify postural abnormalities, address imbalance/dizziness and instruct in home and community integration to attain remaining goals. []  See Plan of Care  []  See progress note/recertification  []  See Discharge Summary         Progress towards goals / Updated goals:  Short Term Goals: To be accomplished in 2 weeks:                 VDHXVWJ will report compliance with HEP at least 1x/day to aid in rehabilitation program.                 Status at IE:NA                 UTFPFYT: Met 05/28/2019                    UBMZIDI will demonstrate knee AROM of 5-115 degrees to aid in completion of ADLs.                Status at IE:7-106                  Current: In-progress 2-110 05/28/2019     Long Term Goals: To be accomplished in 4 weeks:                 LKWVZRN will increase strength to 4+/5 throughout B LEs to aid in completion of ADLs.                Status at IE:3+/5                 Current: In-progress 4/5 05/28/2019                    Patient will report pain less than 1-2/10 average to aid in completion of ADLs.                Status at IE:4/10                 Current:In-Progress 0-4 with ADLs 05/28/2019                    Patient will perform 10 pain free kettle bell squats with 10lbs with good form/technique to aid in completion of ADLs.                Status at IE:                 CQKBQOA: In-progress 10 sit to stands with 3/10 pain 05/28/2019     Patient will improve FOTO to 64 points overall to demonstrate improvement in functional ability.                Status at IE: 55                 Current:  In-progress 69 05/28/2019        PLAN  []  Upgrade activities as tolerated     [x]  Continue plan of care  []  Update interventions per flow sheet []  Discharge due to:_  []  Other:_      Abigail Coronel, PT, DPT 5/30/2019  3:09 PM    Future Appointments   Date Time Provider Aquilino Tirado   6/4/2019  1:00 PM TODD Austin [facility]   6/6/2019  2:00 PM TODD Austin [facility]

## 2019-06-04 ENCOUNTER — APPOINTMENT (OUTPATIENT)
Dept: PHYSICAL THERAPY | Age: 74
End: 2019-06-04

## 2019-06-06 ENCOUNTER — APPOINTMENT (OUTPATIENT)
Dept: PHYSICAL THERAPY | Age: 74
End: 2019-06-06

## 2019-07-05 NOTE — PROGRESS NOTES
[facility] at [address]: [phone]   Fax: [phone]  Discharge Summary  Patient Name: Alisha Rockwell :    Medical   Diagnosis: Pain in left knee [M25.562] Treatment Diagnosis: Left Knee Pain   Onset Date: 3/19/2019     Referral Source: Eva Pearson MD Start of Care [facility]): 2019   Prior Hospitalization: See medical history Provider #: [id number]   Prior Level of Function: House Keeping, cooking, active   Comorbidities: Thyroidism, Osteopenia, Osteoarthritis, High blood pressure, Hx of breast cancer   Medications: Verified on Patient Summary List      ===========================================================================================  Assessment / Summary of Care:  Alisha Rockwell is a 69 y.o.  female s/p left total knee arthroplasty performed on 2019. Patient has not returned to clinic in 30 days as she had to leave the treatment area due to a family emergency. Unable to perform formal assessment on patient as a result.     ===========================================================================================    Plan: Discharge to [facility].     Goals: Unable to reassess.    ===========================================================================================  Subjective: NA      Objective: NA    Therapist Signature: Jeffery Harrington PT DPT Date: 2019     Time: 11:19 AM

## 2021-03-15 ENCOUNTER — APPOINTMENT (OUTPATIENT)
Dept: PHYSICAL THERAPY | Age: 76
End: 2021-03-15

## 2021-04-06 ENCOUNTER — HOSPITAL ENCOUNTER (OUTPATIENT)
Dept: PHYSICAL THERAPY | Age: 76
Discharge: HOME | End: 2021-04-06
Payer: MEDICARE

## 2021-04-06 PROCEDURE — 97110 THERAPEUTIC EXERCISES: CPT

## 2021-04-06 PROCEDURE — 97161 PT EVAL LOW COMPLEX 20 MIN: CPT

## 2021-04-06 NOTE — PROGRESS NOTES
PT DAILY TREATMENT NOTE/KNEE EVAL 10-18    Patient Name: Kim García  Date:2021  : 1945  [x]  Patient  Verified  Payor: Chente Haver / Plan: [place] MED[place]ARE PART [place] & B / Product Type: Medicare /    In time:1345  Out time:1430  Total Treatment Time (min): 45  Visit #: 1 of 24    Medicare/BCBS Only   Total Timed Codes (min):  23 1:1 Treatment Time:  45     Treatment Area: Left knee pain [M25.562]  Left hip pain [M25.552]    SUBJECTIVE  Pain Level (0-10 scale): 4  [x]constant []intermittent []improving []worsening []no change since onset    Any medication changes, allergies to medications, adverse drug reactions, diagnosis change, or new procedure performed?: [x] No    [] Yes (see summary sheet for update)  Subjective functional status/changes:     Patient presents with c/o left knee pain s/p left knee revision performed 2020 due to surgical complications from surgery in 2020. Patient describes pain as constant, sharp stabbing, stiff. Pain is worse in PM. Denies numbness/tingling. Denies popping/clicking. Aggravating factors:weight bearing, walking. Alleviating factors: rest, ice. Denies red flags: SOB, chest pain, dizziness/lightheadedness, blurred/double vision, HA, chills/fevers, night sweats, change in bowel/bladder control, abdominal pain, difficulty swallowing, slurred speech, unexplained weight gain/loss, nausea, vomiting. PMHx: Thyroidism, osteopenia, Osteoarthritis, High blood pressure, hx of breast cancer. Surgical Hx: Right TKA, Left TKA, Left TKA revision, Mastectomy on right. Social Hx: Lives with  and grandson in two story home, occasional alcohol, work status: retired. PLOF: [employer], cooking. CLOF: Limited by knee pain weight bearing tolerance.     OBJECTIVE/EXAMINATION    22 min [x]Eval                  []Re-Eval       23 min Therapeutic Exercise:  [x] See flow sheet :   Rationale: increase ROM, increase strength and decrease pain to improve the patients ability to complete ADLs        With   [x] TE   [] TA   [] neuro   [] other: Patient Education: [x] Review HEP    [] Progressed/Changed HEP based on:   [] positioning   [] body mechanics   [] transfers   [] heat/ice application    [] other:        Physical Therapy Evaluation - Knee    Posture: Forward Head and rounded shoulders    Gait:  [] Normal    [] Abnormal    [x] Antalgic    [] NWB    Device:None    Describe: Patient ambulates with independence demonstrating an antalgic gait pattern as she has reduced stance time on the left and decreased step length on the right. ROM / Strength  [] Unable to assess                  AROM               Strength (1-5)    Left Right Left Right   Hip Flexion   4- 4+    Extension   4 4+    Abduction   3+ 4    Adduction   4+ 4+   Knee Flexion 115 118 4- 4    Extension 0 0 4 5   Ankle Plantarflexion   4- 4    Dorsiflexion   4+ 4+       Palpation:   Neg/Pos  Neg/Pos  Neg/Pos   Joint Line Pos Quad tendon Neg Patellar ligament Neg   Patella Neg Fibular head Pos Pes Anserinus Neg   Tibial tubercle Pos Hamstring tendons Pos Infrapatellar fat pad Neg     Optional Tests:  Patellar Mobility   []L []R Hypermobile [x]L []R Hypomobile         Girth Measurements:     Cm at joint line   Left 36   Right  37     Other tests/comments:    Edema present in Left knee  30 second sit to stand: 14     Pain Level (0-10 scale) post treatment: 4    ASSESSMENT/Changes in Function:     Patient presents with c/o left knee pain s/p left knee revision performed October 2020 due to surgical complications from surgery in March 2020. She has reduced bilateral LE strength left more than right. She is tender to palpation over, lateral joint line, hamstring tendons and fibular head. Patient ambulates with independence demonstrating an antalgic gait pattern as she has reduced stance time on the left and decreased step length on the right. Patient presentation is consistent with lateral hamstring tendinopathy accompanied by generalized deconditioning of the left LE. Patient will benefit from skilled PT services to modify and progress therapeutic interventions, address functional mobility deficits, address ROM deficits, address strength deficits, analyze and address soft tissue restrictions, analyze and cue movement patterns, analyze and modify body mechanics/ergonomics and assess and modify postural abnormalities to attain her goals.      []  See Plan of Care  []  See progress note/recertification  []  See Discharge Summary         Progress towards goals / Updated goals:  See POC    PLAN  []  Upgrade activities as tolerated     [x]  Continue plan of care  []  Update interventions per flow sheet       []  Discharge due to:_  []  Other:_      Stephanie Nunes, PT, DPT 4/6/2021  1:47 PM

## 2021-04-06 NOTE — PROGRESS NOTES
[facility] at [address] [place] [address] Drive: [phone]   Fax: [phone]  PLAN OF CARE / [address] [place] [address] PHYSICAL THERAPY SERVICES  Patient Name: Timothy Walls : 1945   Medical   Diagnosis: Left knee pain [M25.562] Treatment Diagnosis: Left knee pain   Onset Date: chronic     Referral Source: Daria Gallardo MD Start of Care [facility] [place] operated by [facility]): 2021   Prior Hospitalization: See medical history Provider #: [id number]   Prior Level of Function: House Keeping, cooking, walking for exercise   Comorbidities: Thyroidism, osteopenia, Osteoarthritis, High blood pressure, hx of breast cancer   Medications: Verified on Patient Summary List   The Plan of Care and following information is based on the information from the initial evaluation.   ===========================================================================================  Assessment / key information:  Timothy Walls is a 77 y.o.  female presents with  c/o left knee pain s/p left knee revision performed 2020 due to surgical complications from surgery in 2020. She has reduced bilateral LE strength left more than right. She is tender to palpation over, lateral joint line, hamstring tendons and fibular head. Patient ambulates with independence demonstrating an antalgic gait pattern as she has reduced stance time on the left and decreased step length on the right. Patient presentation is consistent with lateral hamstring tendinopathy accompanied by generalized deconditioning of the left LE. Patient will benefit from skilled PT services to modify and progress therapeutic interventions, address functional mobility deficits, address ROM deficits, address strength deficits, analyze and address soft tissue restrictions, analyze and cue movement patterns, analyze and modify body mechanics/ergonomics and assess and modify postural abnormalities to attain her goals. ===========================================================================================  Eval Complexity: History HIGH Complexity :3+ comorbidities / personal factors will impact the outcome/ POC ;  Examination  LOW Complexity : 1-2 Standardized tests and measures addressing body structure, function, activity limitation and / or participation in recreation ; Presentation LOW Complexity : Stable, uncomplicated ;  Decision Making MEDIUM Complexity : FOTO score of 26-74; Overall Complexity LOW   Problem List: pain affecting function, decrease ROM, decrease strength, edema affecting function, impaired gait/ balance, decrease ADL/ functional abilitiies, decrease activity tolerance, decrease flexibility/ joint mobility and decrease transfer abilities   Treatment Plan may include any combination of the following: Therapeutic exercise, Therapeutic activities, Neuromuscular re-education, Physical agent/modality, Gait/balance training, Manual therapy, Aquatic therapy, Patient education, Self Care training, Functional mobility training, Home safety training and Stair training  Patient / Family readiness to learn indicated by: asking questions, trying to perform skills and interest  Persons(s) to be included in education: patient (P)  Barriers to Learning/Limitations: no  Measures taken if barriers to learning:   Patient Goal (s): \"no more pain\"   Patient self reported health status: good  Rehabilitation Potential: good  Goals:  Short Term Goals: To be accomplished in 4 weeks:   Patient will report compliance with HEP at least 1x/day to aid in rehabilitation program.   Status at IE:reviewed activity pacing   Current:Same as IE     Long Term Goals: To be accomplished in 8 weeks:   Patient will increase strength to 5/5 throughout bilateral LEs to aid in completion of ADLs. Status at IE:4-/5   Current:Same as IE     Patient will report pain less than 1-2/10 average to aid in completion of ADLs.    Status at IE: 4/10   Current:Same as IE     Patient will perform 10# pain free kettle bell squats with 11lbs with good form/technique to aid in completion of ADLs. Status at IE: Pain 4/10 with STS   Current:Same as IE    Patient will improve FOTO to 53 points overall to demonstrate improvement in functional ability. Status at IE: FOTO score = 31 (an established functional score where 100 = no disability)   Current:Same as IE       Frequency / Duration:   Patient to be seen   times per week for 12  weeks:  Patient / Caregiver education and instruction: self care and exercises      . Therapist Signature: Migel Bradford PT, DPT Date: 1/6/8432   Certification Period: 4/6/2021 - 7/5/2021 Time: 2:53 PM   ===========================================================================================  I certify that the above Physical Therapy Services are being furnished while the patient is under my care. I agree with the treatment plan and certify that this therapy is necessary. Physician Signature:        Date:       Time:     Please sign and return to [facility] or you may fax the signed copy to [phone]. Thank you.

## 2021-04-12 ENCOUNTER — APPOINTMENT (OUTPATIENT)
Dept: PHYSICAL THERAPY | Age: 76
End: 2021-04-12
Payer: MEDICARE

## 2021-04-15 ENCOUNTER — HOSPITAL ENCOUNTER (OUTPATIENT)
Dept: PHYSICAL THERAPY | Age: 76
Discharge: HOME | End: 2021-04-15
Payer: MEDICARE

## 2021-04-15 PROCEDURE — 97110 THERAPEUTIC EXERCISES: CPT

## 2021-04-15 PROCEDURE — 97530 THERAPEUTIC ACTIVITIES: CPT

## 2021-04-15 NOTE — PROGRESS NOTES
PT DAILY TREATMENT NOTE - [facility]     Patient Name: Anupam Cerrato  Date:4/15/2021  : 1945  [x]  Patient  Verified  Payor: Dannie Head / Plan: [place] MEDICARE PART A & B / Product Type: Medicare /    In time:1100  Out time:1138  Total Treatment Time (min): 38  Total Timed Codes (min): 38   1:1 Treatment Time ( only): 45   Visit #: 2 of 24    Treatment Area: Left knee pain [M25.562]    SUBJECTIVE  Pain Level (0-10 scale): 0  Any medication changes, allergies to medications, adverse drug reactions, diagnosis change, or new procedure performed?: [x] No    [] Yes (see summary sheet for update)  Subjective functional status/changes:   [] No changes reported    Patient reports that her knee is feeling better today. OBJECTIVE    30 min Therapeutic Exercise:  [x] See flow sheet :   Rationale: increase ROM, increase strength and decrease pain to improve the patients ability to complete ADLs    8 min Therapeutic Activity:  [x]  See flow sheet :   Rationale: increase ROM, increase strength and improve coordination  to improve the patients ability to complete ADLs           With   [x] TE   [] TA   [] neuro   [] other: Patient Education: [x] Review HEP    [] Progressed/Changed HEP based on:   [] positioning   [] body mechanics   [] transfers   [] heat/ice application    [] other:      Other Objective/Functional Measures: NA     Pain Level (0-10 scale) post treatment: 0    ASSESSMENT/Changes in Function: Patient responds well to treatment session. Patient required cues to recall exercise parameters. Reviewed activity pacing with patient to promote muscle recovery time. She demonstrated understanding.  No adverse effects were noted from today's treatment session    Patient will continue to benefit from skilled PT services to modify and progress therapeutic interventions, address functional mobility deficits, address ROM deficits, address strength deficits, analyze and address soft tissue restrictions, analyze and cue movement patterns, analyze and modify body mechanics/ergonomics, assess and modify postural abnormalities, address imbalance/dizziness and instruct in home and community integration to attain remaining goals. []  See Plan of Care  []  See progress note/recertification  []  See Discharge Summary         Progress towards goals / Updated goals:  Short Term Goals: To be accomplished in 4 weeks:                 Patient will report compliance with HEP at least 1x/day to aid in rehabilitation program.                 Status at IE:reviewed activity pacing                 Current: In-progress, will provide HEP next visit, 4/15/2021     Long Term Goals: To be accomplished in 8 weeks:                 Patient will increase strength to 5/5 throughout bilateral LEs to aid in completion of ADLs. Status at IE:4-/5                 Current:Same as IE                    Patient will report pain less than 1-2/10 average to aid in completion of ADLs. Status at IE: 4/10                 Current:Same as IE                    Patient will perform 10# pain free kettle bell squats with 11lbs with good form/technique to aid in completion of ADLs. Status at IE: Pain 4/10 with STS                 Current:Same as IE     Patient will improve FOTO to 53 points overall to demonstrate improvement in functional ability.                  Status at IE: FOTO score = 31 (an established functional score where 100 = no disability)                 Current:Same as IE    PLAN  []  Upgrade activities as tolerated     [x]  Continue plan of care  []  Update interventions per flow sheet       []  Discharge due to:_  []  Other:_      Zaina River PT, DPT 4/15/2021  10:46 AM    Future Appointments   Date Time Provider Aquilino Tirado   4/15/2021 11:00 AM TODD Castro [facility]   4/20/2021  1:45 PM TODD Castro [facility]   4/22/2021 11:00 AM TODD Castro [facility]   4/27/2021 1:00 PM Luis Lesser, PT [facility]   4/29/2021 11:00 AM Luis Lesser, PT [facility]   5/4/2021  1:00 PM Luis Lesser, PT [facility]   5/6/2021 11:00 AM Luis Lesser, PT [facility]   5/11/2021  1:00 PM Luis Lesser, PT [facility]   5/13/2021 11:00 AM Luis Lesser, PT [facility]   5/18/2021  1:00 PM Luis Lesser, PT [facility]   5/20/2021 11:00 AM Luis Lesser, PT [facility]

## 2021-04-22 ENCOUNTER — HOSPITAL ENCOUNTER (OUTPATIENT)
Dept: PHYSICAL THERAPY | Age: 76
Discharge: HOME | End: 2021-04-22
Payer: MEDICARE

## 2021-04-22 PROCEDURE — 97530 THERAPEUTIC ACTIVITIES: CPT

## 2021-04-22 PROCEDURE — 97110 THERAPEUTIC EXERCISES: CPT

## 2021-04-22 NOTE — PROGRESS NOTES
PT DAILY TREATMENT NOTE - [facility]     Patient Name: Vijay Vera  Date:2021  : 1945  [x]  Patient  Verified  Payor: [place] MEDICARE / Plan: [place] MEDICARE PART A & B / Product Type: Medicare /    In time:1106  Out time:1144  Total Treatment Time (min): 38  Total Timed Codes (min): 38   1:1 Treatment Time ( [address] only): 45   Visit #: 3 of 24    Treatment Area: Left knee pain [M25.562]    SUBJECTIVE  Pain Level (0-10 scale): 5  Any medication changes, allergies to medications, adverse drug reactions, diagnosis change, or new procedure performed?: [x] No    [] Yes (see summary sheet for update)  Subjective functional status/changes:   [] No changes reported  Patient reports that she only got 2 hours of sleep last night due to stress. She states that she is tired today and her knee is sore. OBJECTIVE    30 min Therapeutic Exercise:  [x] See flow sheet :   Rationale: increase ROM, increase strength and decrease pain to improve the patients ability to complete ADLs    8 min Therapeutic Activity:  [x]  See flow sheet :   Rationale: increase ROM, increase strength and improve coordination  to improve the patients ability to complete ADLs          With   [x] TE   [] TA   [] neuro   [] other: Patient Education: [x] Review HEP    [] Progressed/Changed HEP based on:   [] positioning   [] body mechanics   [] transfers   [] heat/ice application    [] other:      Other Objective/Functional Measures: NA     Pain Level (0-10 scale) post treatment: 2    ASSESSMENT/Changes in Function: Patient responds well to treatment session. Patient demonstrated improved activity tolerance requiring fewer recovery periods. She continues to require cues for activity pacing. Will advance exercise next visit.  No adverse effects were noted from today's treatment session    Patient will continue to benefit from skilled PT services to modify and progress therapeutic interventions, address functional mobility deficits, address ROM deficits, address strength deficits, analyze and address soft tissue restrictions, analyze and cue movement patterns, analyze and modify body mechanics/ergonomics, assess and modify postural abnormalities, address imbalance and instruct in home and community integration to attain remaining goals. []  See Plan of Care  []  See progress note/recertification  []  See Discharge Summary         Progress towards goals / Updated goals:  Short Term Goals: To be accomplished in 4 weeks:                 [id number] will report compliance with HEP at least 1x/day to aid in rehabilitation program.                 Status at IE:reviewed activity pacing                 Current: In-progress,reviewed activity pacing, 4/22/2021     Long Term Goals: To be accomplished in 8 weeks:                 Patient will increase strength to 5/5 throughout bilateral LEs to aid in completion of ADLs.                Status at IE:4-/5                 Current:Same as IE                    Patient will report pain less than 1-2/10 average to aid in completion of ADLs.                Status at IE: 4/10                 Current:Same as IE                    Patient will perform 10# pain free kettle bell squats with 11lbs with good form/technique to aid in completion of ADLs.                Status at IE: Pain 4/10 with STS                 Current:Same as IE     Patient will improve FOTO to 53 points overall to demonstrate improvement in functional ability.                  Status at IE: FOTO score = 31 (an established functional score where 100 = no disability)                 Current:Same as IE    PLAN  []  Upgrade activities as tolerated     [x]  Continue plan of care  []  Update interventions per flow sheet       []  Discharge due to:_  []  Other:_      Gearldine Paget, PT, DPT 4/22/2021  11:37 AM    Future Appointments   Date Time Provider Aquilino Tirado   4/27/2021  1:00 PM TODD Sparks [facility]   4/29/2021 11:00 AM TODD Sparks [facility]   5/4/2021  1:00 PM Sue Person, PT [facility]   5/6/2021 11:00 AM Sue Person, PT [facility]   5/11/2021  1:00 PM Sue Person, PT [facility]   5/13/2021 11:00 AM Sue Person, PT [facility]   5/18/2021  1:00 PM Sue Person, PT [facility]   5/20/2021 11:00 AM Sue Person, PT [facility]

## 2021-04-27 ENCOUNTER — APPOINTMENT (OUTPATIENT)
Dept: PHYSICAL THERAPY | Age: 76
End: 2021-04-27
Payer: MEDICARE

## 2021-04-29 ENCOUNTER — HOSPITAL ENCOUNTER (OUTPATIENT)
Dept: PHYSICAL THERAPY | Age: 76
Discharge: HOME | End: 2021-04-29
Payer: MEDICARE

## 2021-04-29 PROCEDURE — 97530 THERAPEUTIC ACTIVITIES: CPT

## 2021-04-29 PROCEDURE — 97110 THERAPEUTIC EXERCISES: CPT

## 2021-04-29 NOTE — PROGRESS NOTES
PT DAILY TREATMENT NOTE - [facility]     Patient Name: Edilson Sensor  Date:2021  : 1945  [x]  Patient  Verified  Payor: VA MEDICARE / Plan: VA MEDICARE PART A & B / Product Type: Medicare /    In time:1103  Out time:1141  Total Treatment Time (min): 38  Total Timed Codes (min): 38   1:1 Treatment Time ( [address] only): 45   Visit #: 4 of 24    Treatment Area: Left knee pain [M25.562]    SUBJECTIVE  Pain Level (0-10 scale): 3  Any medication changes, allergies to medications, adverse drug reactions, diagnosis change, or new procedure performed?: [x] No    [] Yes (see summary sheet for update)  Subjective functional status/changes:   [] No changes reported  Patient resorts that she was sore after last visit. OBJECTIVE    30 min Therapeutic Exercise:  [x] See flow sheet :   Rationale: increase ROM, increase strength and decrease pain to improve the patients ability to complete ADLs    8 min Therapeutic Activity:  [x]  See flow sheet :   Rationale: increase ROM, increase strength and improve coordination  to improve the patients ability to complete ADLs           With   [x] TE   [] TA   [] neuro   [] other: Patient Education: [x] Review HEP    [] Progressed/Changed HEP based on:   [] positioning   [] body mechanics   [] transfers   [] heat/ice application    [] other:      Other Objective/Functional Measures: NA     Pain Level (0-10 scale) post treatment: 3    ASSESSMENT/Changes in Function: Patient responds well to treatment session. Patient required cues for activity pacing. Reduced exercise intensity as patient reporting feeling very sore after last visit.  No adverse effects were noted from today's treatment session    Patient will continue to benefit from skilled PT services to modify and progress therapeutic interventions, address functional mobility deficits, address ROM deficits, address strength deficits, analyze and address soft tissue restrictions, analyze and cue movement patterns, analyze and modify body mechanics/ergonomics, assess and modify postural abnormalities, address imbalance/dizziness and instruct in home and community integration to attain remaining goals. []  See Plan of Care  []  See progress note/recertification  []  See Discharge Summary         Progress towards goals / Updated goals:  Short Term Goals: To be accomplished in 4 weeks:                 [id number] will report compliance with HEP at least 1x/day to aid in rehabilitation program.                 Status at IE:reviewed activity pacing                 Current: In-progress,reviewed activity pacing to reduce exercise intensity, 4/29/2021     Long Term Goals: To be accomplished in 8 weeks:                 [id number] will increase strength to 5/5 throughout bilateral LEs to aid in completion of ADLs.                Status at IE:4-/5                 Current:Same as IE                    Patient will report pain less than 1-2/10 average to aid in completion of ADLs.                Status at IE: 4/10                 Current:Same as IE                    Patient will perform 10# pain free kettle bell squats with 11lbs with good form/technique to aid in completion of ADLs.                Status at IE: Pain 4/10 with STS                 Current:Same as IE     Patient will improve FOTO to 53 points overall to demonstrate improvement in functional ability.                  Status at IE: FOTO score = 31 (an established functional score where 100 = no disability)                 Current:Same as IE    PLAN  []  Upgrade activities as tolerated     [x]  Continue plan of care  []  Update interventions per flow sheet       []  Discharge due to:_  []  Other:_      Isabel Lozano PT, DPT 4/29/2021  11:11 AM    Future Appointments   Date Time Provider Aquilino Tirado   5/4/2021  1:00 PM [facility]D [facility] [address]   5/6/2021 11:00 AM TODD [facility] [address]   5/11/2021  1:00 PM TODD Zimmerman [address]   5/13/2021 11:00 AM TODD Pak [facility]   5/18/2021  1:00 PM TODD Pak [facility]   5/20/2021 11:00 AM TODD Pak [facility]

## 2021-05-04 ENCOUNTER — HOSPITAL ENCOUNTER (OUTPATIENT)
Dept: PHYSICAL THERAPY | Age: 76
Discharge: HOME | End: 2021-05-04
Payer: MEDICARE

## 2021-05-04 PROCEDURE — 97110 THERAPEUTIC EXERCISES: CPT

## 2021-05-04 PROCEDURE — 97530 THERAPEUTIC ACTIVITIES: CPT

## 2021-05-04 NOTE — PROGRESS NOTES
PT DAILY TREATMENT NOTE - [facility]     Patient Name: Lexi Carlos  Date:2021  : 1945  [x]  Patient  Verified  Payor: Chad Moy / Plan: [place] / Product Type: Medicare /    In time:1300  Out time:1340  Total Treatment Time (min): 40  Total Timed Codes (min): 40   1:1 Treatment Time ( [address] only): 40   Visit #: 5 of 24    Treatment Area: Left knee pain [M25.562]    SUBJECTIVE  Pain Level (0-10 scale): 0  Any medication changes, allergies to medications, adverse drug reactions, diagnosis change, or new procedure performed?: [x] No    [] Yes (see summary sheet for update)  Subjective functional status/changes:   [] No changes reported  Patient reports that she is doing well and has no new complaints. OBJECTIVE    25 min Therapeutic Exercise:  [x] See flow sheet :   Rationale: increase ROM, increase strength and decrease pain to improve the patients ability to complete ADLs    15 min Therapeutic Activity:  [x]  See flow sheet :   Rationale: increase ROM, increase strength and improve coordination  to improve the patients ability to complete ADLs           With   [x] TE   [] TA   [] neuro   [] other: Patient Education: [x] Review HEP    [] Progressed/Changed HEP based on:   [] positioning   [] body mechanics   [] transfers   [] heat/ice application    [] other:      Other Objective/Functional Measures: NA     Pain Level (0-10 scale) post treatment: 0    ASSESSMENT/Changes in Function: Patient responds well to treatment session. Patient required fewer cues for activity pacing. She was challenged with hip abduction exercise. Will advance exercise as tolerated.  No adverse effects were noted from today's treatment session      Patient will continue to benefit from skilled PT services to modify and progress therapeutic interventions, address functional mobility deficits, address ROM deficits, address strength deficits, analyze and address soft tissue restrictions, analyze and cue movement patterns, analyze and modify body mechanics/ergonomics, assess and modify postural abnormalities, address imbalance and instruct in home and community integration to attain remaining goals. []  See Plan of Care  []  See progress note/recertification  []  See Discharge Summary         Progress towards goals / Updated goals:  Short Term Goals: To be accomplished in 4 weeks:                 OCXCIJF will report compliance with HEP at least 1x/day to aid in rehabilitation program.                 Status at IE:reviewed activity pacing                 Current: In-progress,reviewed activity pacing to reduce exercise intensity, 4/29/2021     Long Term Goals: To be accomplished in 8 weeks:                 MMJRYND will increase strength to 5/5 throughout bilateral LEs to aid in completion of ADLs.                Status at IE:4-/5                 Current:Same as IE                    Patient will report pain less than 1-2/10 average to aid in completion of ADLs.                Status at IE: 4/10                 Current: In-progress 0-3/10, 5/4/2021                    Patient will perform 10# pain free kettle bell squats with 11lbs with good form/technique to aid in completion of ADLs.                Status at IE: Pain 4/10 with STS                 Current:Same as IE     Patient will improve FOTO to 53 points overall to demonstrate improvement in functional ability.                  Status at IE: FOTO score = 31 (an established functional score where 100 = no disability)                 Current:Same as IE     PLAN  []  Upgrade activities as tolerated     [x]  Continue plan of care  []  Update interventions per flow sheet       []  Discharge due to:_  []  Other:_      Katalina Jimenez PT, DPT 5/4/2021  1:11 PM    Future Appointments   Date Time Provider Aquilino Tirado   5/6/2021 11:00 AM TODD Arevalo [facility]   5/11/2021  1:00 PM TODD Arevalo THE [facility]   5/13/2021 11:00 AM Nazario Carver PT [address] [facility]   5/18/2021  1:00 PM TODD Brito THE [facility]   5/20/2021 11:00 AM TODD Brito THE [facility]

## 2021-05-06 ENCOUNTER — HOSPITAL ENCOUNTER (OUTPATIENT)
Dept: PHYSICAL THERAPY | Age: 76
Discharge: HOME | End: 2021-05-06
Payer: MEDICARE

## 2021-05-06 PROCEDURE — 97530 THERAPEUTIC ACTIVITIES: CPT

## 2021-05-06 PROCEDURE — 97110 THERAPEUTIC EXERCISES: CPT

## 2021-05-06 NOTE — PROGRESS NOTES
PT DAILY TREATMENT NOTE - [facility]     Patient Name: Roma Mccracken  Date:2021  : 1945  [x]  Patient  Verified  Payor: Araceli Dress / Plan: [place] MEDICARE PART A & B / Product Type: Medicare /    In time:1101  Out time:1143  Total Treatment Time (min): 42  Total Timed Codes (min): 42   1:1 Treatment Time ( only): 42   Visit #: 6 of 24    Treatment Area: Left knee pain [M25.562]    SUBJECTIVE  Pain Level (0-10 scale): 0  Any medication changes, allergies to medications, adverse drug reactions, diagnosis change, or new procedure performed?: [x] No    [] Yes (see summary sheet for update)  Subjective functional status/changes:   [] No changes reported    Patient reports that she had muscle soreness after last visit but she expects it. OBJECTIVE    32 min Therapeutic Exercise:  [x] See flow sheet :   Rationale: increase ROM, increase strength and decrease pain to improve the patients ability to complete ADLs    10 min Therapeutic Activity:  [x]  See flow sheet :   Rationale: increase ROM, increase strength and improve coordination  to improve the patients ability to complete ADLs           With   [x] TE   [] TA   [] neuro   [] other: Patient Education: [x] Review HEP    [] Progressed/Changed HEP based on:   [] positioning   [] body mechanics   [] transfers   [] heat/ice application    [] other:      Other Objective/Functional Measures: NA     Pain Level (0-10 scale) post treatment: 0    ASSESSMENT/Changes in Function: Patient responds well to treatment session. Patient continues to require cues for activity pacing as she does not allow for proper muscle recovery time with exercise. She is challenged with lateral hip strengthening activities. Will advance exercise as tolerated.  No adverse effects were noted from today's treatment session    Patient will continue to benefit from skilled PT services to modify and progress therapeutic interventions, address functional mobility deficits, address ROM deficits, address strength deficits, analyze and address soft tissue restrictions, analyze and cue movement patterns, analyze and modify body mechanics/ergonomics, assess and modify postural abnormalities, address imbalance and instruct in home and community integration to attain remaining goals. []  See Plan of Care  []  See progress note/recertification  []  See Discharge Summary         Progress towards goals / Updated goals:  Short Term Goals: To be accomplished in 4 weeks:                 FJLSERGEILZ will report compliance with HEP at least 1x/day to aid in rehabilitation program.                 Status at IE:reviewed activity pacing                 Current: In-progress,reviewed activity pacing to reduce exercise intensity, 4/29/2021     Long Term Goals: To be accomplished in 8 weeks:                 Patient will increase strength to 5/5 throughout bilateral LEs to aid in completion of ADLs.                Status at IE:4-/5                 Current:Same as IE                    Patient will report pain less than 1-2/10 average to aid in completion of ADLs.                Status at IE: 4/10                 Current: In-progress 0-3/10, 5/4/2021                    Patient will perform 10# pain free kettle bell squats with 11lbs with good form/technique to aid in completion of ADLs.                Status at IE: Pain 4/10 with STS                 Current:Same as IE     Patient will improve FOTO to 53 points overall to demonstrate improvement in functional ability.                  Status at IE: FOTO score = 31 (an established functional score where 100 = no disability)                 Current:Same as IE  PLAN  []  Upgrade activities as tolerated     [x]  Continue plan of care  []  Update interventions per flow sheet       []  Discharge due to:_  []  Other:_      Stephanie Nunes, PT, DPT 5/6/2021  11:05 AM    Future Appointments   Date Time Provider Aquilino Tirado   5/11/2021  1:00 PM Dimitri Greco [facility]   5/13/2021 11:00 AM TODD Garcia [facility]   5/18/2021  1:00 PM TODD Garcia [facility]   5/20/2021 11:00 AM TODD Garcia [facility]

## 2021-05-11 ENCOUNTER — HOSPITAL ENCOUNTER (OUTPATIENT)
Dept: PHYSICAL THERAPY | Age: 76
Discharge: HOME | End: 2021-05-11
Payer: MEDICARE

## 2021-05-11 PROCEDURE — 97110 THERAPEUTIC EXERCISES: CPT

## 2021-05-11 PROCEDURE — 97530 THERAPEUTIC ACTIVITIES: CPT

## 2021-05-11 NOTE — PROGRESS NOTES
PT DAILY TREATMENT NOTE - [facility]     Patient Name: Shasta López  Date:2021  : 1945  [x]  Patient  Verified  Payor: Jacquie Frank / Plan: [place] MEDICARE PART A & B / Product Type: Medicare /    In time:1300  Out time:1345  Total Treatment Time (min): 45  Total Timed Codes (min): 45   1:1 Treatment Time ( [address] only): 39   Visit #: 7 of 24    Treatment Area: Left knee pain [M25.562]    SUBJECTIVE  Pain Level (0-10 scale): 0  Any medication changes, allergies to medications, adverse drug reactions, diagnosis change, or new procedure performed?: [x] No    [] Yes (see summary sheet for update)  Subjective functional status/changes:   [] No changes reported    Patient reports no new changes since last visit. OBJECTIVE    35 min Therapeutic Exercise:  [x] See flow sheet :   Rationale: increase ROM, increase strength and decrease pain to improve the patients ability to complete ADLs    10 min Therapeutic Activity:  [x]  See flow sheet :   Rationale: increase ROM, increase strength and improve coordination  to improve the patients ability to complete ADLs           With   [x] TE   [] TA   [] neuro   [] other: Patient Education: [x] Review HEP    [] Progressed/Changed HEP based on:   [] positioning   [] body mechanics   [] transfers   [] heat/ice application    [] other:      Other Objective/Functional Measures: NA     Pain Level (0-10 scale) post treatment: 0    ASSESSMENT/Changes in Function: Patient responds well to treatment session. Advanced exercise as patient demonstrated improved activity tolerance. She was challenged with exercise prescribed. No adverse effects were noted from today's treatment session.     Patient will continue to benefit from skilled PT services to modify and progress therapeutic interventions, address functional mobility deficits, address ROM deficits, address strength deficits, analyze and address soft tissue restrictions, analyze and cue movement patterns, analyze and modify body mechanics/ergonomics, assess and modify postural abnormalities, address imbalance/dizziness and instruct in home and community integration to attain remaining goals. []  See Plan of Care  []  See progress note/recertification  []  See Discharge Summary         Progress towards goals / Updated goals:  Short Term Goals: To be accomplished in 4 weeks:                 JGGMUKX will report compliance with HEP at least 1x/day to aid in rehabilitation program.                 Status at IE:reviewed activity pacing                 Current: In-progress,reviewed activity pacing to reduce exercise intensity, 4/29/2021     Long Term Goals: To be accomplished in 8 weeks:                 Patient will increase strength to 5/5 throughout bilateral LEs to aid in completion of ADLs.                Status at IE:4-/5                 Current: In-progress, 4/5, 5/11/2021                    Patient will report pain less than 1-2/10 average to aid in completion of ADLs.                Status at IE: 4/10                 Current: In-progress 0-3/10, 5/4/2021                    Patient will perform 10# pain free kettle bell squats with 11lbs with good form/technique to aid in completion of ADLs.                Status at IE: Pain 4/10 with STS                 Current: Patient reports 0/10 pain during 2x10 STS, 5/11/2021     Patient will improve FOTO to 53 points overall to demonstrate improvement in functional ability.                  Status at IE: FOTO score = 31 (an established functional score where 100 = no disability)                 Current:Same as IE    PLAN  []  Upgrade activities as tolerated     [x]  Continue plan of care  []  Update interventions per flow sheet       []  Discharge due to:_  []  Other:_      Cyrus Uriarte PT, DPT 5/11/2021  1:07 PM    Future Appointments   Date Time Provider Aquilino Tirado   5/13/2021 11:00 AM TODD Pak [facility]   5/18/2021  1:00 PM TODD Pak [facility]   5/20/2021 11:00 AM TODD Harper [facility]

## 2021-05-18 ENCOUNTER — HOSPITAL ENCOUNTER (OUTPATIENT)
Dept: PHYSICAL THERAPY | Age: 76
Discharge: HOME | End: 2021-05-18
Payer: MEDICARE

## 2021-05-18 PROCEDURE — 97110 THERAPEUTIC EXERCISES: CPT

## 2021-05-18 NOTE — PROGRESS NOTES
PT DAILY TREATMENT NOTE - [facility]     Patient Name: Roma Mccracken  Date:2021  : 1945  [x]  Patient  Verified  Payor: Araceli Dress / Plan: [place] MEDICARE PART A & B / Product Type: Medicare /    In time:1300  Out time:1338  Total Treatment Time (min): 38  Total Timed Codes (min): 38   1:1 Treatment Time ( [address] only): 45   Visit #: 8 of 24    Treatment Area: Left knee pain [M25.562]    SUBJECTIVE  Pain Level (0-10 scale): 0  Any medication changes, allergies to medications, adverse drug reactions, diagnosis change, or new procedure performed?: [x] No    [] Yes (see summary sheet for update)  Subjective functional status/changes:   [] No changes reported    Patient reports that she is feeling much better and has no new complaints. She reports compliance with HEP. OBJECTIVE    38 min Therapeutic Exercise:  [x] See flow sheet :   Rationale: increase ROM, increase strength and decrease pain to improve the patients ability to complete ADLs     With   [x] TE   [] TA   [] neuro   [] other: Patient Education: [x] Review HEP    [] Progressed/Changed HEP based on:   [] positioning   [] body mechanics   [] transfers   [] heat/ice application    [] other:      Other Objective/Functional Measures:   MMT 5/5  Squats: 11# 2 x 10 with 0/10 pain  FOTO 67     Pain Level (0-10 scale) post treatment: 0    ASSESSMENT/Changes in Function: Patient responds well to treatment session. No adverse effects were noted from today's treatment session. Patient is being discharged as she has met 100% of her short and long term goals. She has reduced pain. She has gained strength and functional mobility resulting in improved activity tolerance. She has become independent with HEP and plans to reduce future episodes of care with continued participation in independent exercise. Provided HEP to promote seamless transition to independent exercise.       []  See Plan of Care  []  See progress note/recertification  []  See Discharge Summary         Progress towards goals / Updated goals:  Short Term Goals: To be accomplished in 4 weeks:                 BFAOKFD will report compliance with HEP at least 1x/day to aid in rehabilitation program.                 Status at IE:reviewed activity pacing                 Current: Met, 5/18/2021     Long Term Goals: To be accomplished in 8 weeks:                 AWUSRTD will increase strength to 5/5 throughout bilateral LEs to aid in completion of ADLs.                Status at IE:4-/5                 Current: Met, 5/5, in BLEs, 5/18/2021                    Patient will report pain less than 1-2/10 average to aid in completion of ADLs.                Status at IE: 4/10                 Current:Met, 0-2, 2021                    Patient will perform 10# pain free kettle bell squats with 11lbs with good form/technique to aid in completion of ADLs.                Status at IE: Pain 4/10 with STS                 EGPMWKJ:   Met, 11# 2 x 10 with 0/10 pain, 5/18/2021     Patient will improve FOTO to 53 points overall to demonstrate improvement in functional ability.                  Status at IE: FOTO score = 31 (an established functional score where 100 = no disability)                 Current:Met, 67, 5/18/2021    PLAN  []  Upgrade activities as tolerated     [x]  Continue plan of care  []  Update interventions per flow sheet       []  Discharge due to:_  []  Other:_      Gaviota Garcia PT, DPT 5/18/2021  1:04 PM    Future Appointments   Date Time Provider Aquilino Tirado   5/20/2021 11:00 AM TODD Harper [facility]

## 2021-05-20 ENCOUNTER — APPOINTMENT (OUTPATIENT)
Dept: PHYSICAL THERAPY | Age: 76
End: 2021-05-20
Payer: MEDICARE

## 2021-12-01 ENCOUNTER — HOSPITAL ENCOUNTER (OUTPATIENT)
Dept: PHYSICAL THERAPY | Age: 76
Discharge: HOME | End: 2021-12-01
Payer: MEDICARE

## 2021-12-01 PROCEDURE — 97161 PT EVAL LOW COMPLEX 20 MIN: CPT

## 2021-12-01 NOTE — PROGRESS NOTES
[facility] at [address]: [phone]   Fax: [phone]  PLAN OF CARE / [address] [place] [address] PHYSICAL THERAPY SERVICES  Patient Name: Lucia Edmonds : 1945   Medical   Diagnosis: Pain in left leg [M79.605]  Left knee pain [M25.562] Treatment Diagnosis: Left knee pain  Left leg pain   Onset Date: 2021     Referral Source: Shital Delgadillo [facility] [place]): 2021   Prior Hospitalization: See medical history Provider #: [id number]   Prior Level of Function: House Keeping, cooking   Comorbidities: Thyroidism, osteopenia, Osteoarthritis, High blood pressure, hx of breast cancer   Medications: Verified on Patient Summary List   The Plan of Care and following information is based on the information from the initial evaluation.   ===========================================================================================  Assessment / lovett information:  Lucia Edmonds is a 77 y.o. female presents with  c/o right knee pain and decreased ROM s/p left total knee arthroplasty revision and internal fixation of femur performed 2021 secondary to fracture from trip and fall accident 2021. Patient has reduced left knee ROM. She has decreased strength in bilateral LEs left ore than right. She has reduced balance. Patient ambulates with modified independence utilizing 2 wheel walker with toe touch weight bearing on the left. Patient presentation is consistent s/p femoral internal fixation with revision of total knee arthroplasty secondary to fracture. Patient will benefit from skilled PT services to modify and progress therapeutic interventions, address functional mobility deficits, address ROM deficits, address strength deficits, analyze and address soft tissue restrictions, analyze and cue movement patterns, analyze and modify body mechanics/ergonomics and assess and modify postural abnormalities to attain her goals. ===========================================================================================  Eval Complexity: History HIGH Complexity :3+ comorbidities / personal factors will impact the outcome/ POC ;  Examination  LOW Complexity : 1-2 Standardized tests and measures addressing body structure, function, activity limitation and / or participation in recreation ; Presentation LOW Complexity : Stable, uncomplicated ;  Decision Making MEDIUM Complexity : FOTO score of 26-74; Overall Complexity LOW   Problem List: pain affecting function, decrease ROM, decrease strength, edema affecting function, impaired gait/ balance, decrease ADL/ functional abilitiies, decrease activity tolerance, decrease flexibility/ joint mobility and decrease transfer abilities   Treatment Plan may include any combination of the following: Therapeutic exercise, Therapeutic activities, Neuromuscular re-education, Physical agent/modality, Gait/balance training, Manual therapy, Aquatic therapy, Patient education, Self Care training, Functional mobility training, Home safety training and Stair training  Patient / Family readiness to learn indicated by: asking questions, trying to perform skills and interest  Persons(s) to be included in education: patient (P)  Barriers to Learning/Limitations: no  Measures taken if barriers to learning:   Patient Goal (s): \"No more pain and to recover all use of leg before it was broken\"   Patient self reported health status: good  Rehabilitation Potential: good  Goals:  Short Term Goals: To be accomplished in 4 weeks:   Patient will report compliance with HEP at least 1x/day to aid in rehabilitation program.   Status at IE: Reviewed weight bearing progression   Current:Same as IE     Patient will demonstrate AROM of 0-115 degrees to aid in completion of ADLs. Status at IE: 3-98 degress   Current:Same as IE       Long Term Goals:  To be accomplished in 8 weeks:   Patient will increase strength to 5/5 throughout bilateral LEs to aid in completion of ADLs. Status at IE: 3+/5   Current:Same as IE     Patient will report pain less than 1-2/10 average to aid in completion of ADLs. Status at IE:8/10   Current:Same as IE     Patient will perform 10 pain free kettle bell squats with 10 lbs with good form/technique to aid in completion of ADLs. Status at IE: can perform sit to stand with UEA   Current: Same as IE    Patient will improve FOTO to 57 points overall to demonstrate improvement in functional ability. Status at IE: FOTO score = 36 (an established functional score where 100 = no disability)   Current: Same as IE     Frequency / Duration:   Patient to be seen 3  times per week for 12  weeks:  Patient / Caregiver education and instruction: self care and exercises    Therapist Signature: Klaus Tang PT [id number] Date: 85/7/3370   Certification Period: 12/1/2021 - 3/1/2022 Time: 6:47 PM   ===========================================================================================  I certify that the above Physical Therapy Services are being furnished while the patient is under my care. I agree with the treatment plan and certify that this therapy is necessary. Physician Signature:        Date:       Time:     Please sign and return to [facility] or you may fax the signed copy to [phone]. Thank you.

## 2021-12-07 ENCOUNTER — APPOINTMENT (OUTPATIENT)
Dept: PHYSICAL THERAPY | Age: 76
End: 2021-12-07
Payer: MEDICARE

## 2021-12-07 ENCOUNTER — HOSPITAL ENCOUNTER (OUTPATIENT)
Dept: PHYSICAL THERAPY | Age: 76
Discharge: HOME | End: 2021-12-07
Payer: MEDICARE

## 2021-12-07 PROCEDURE — 97110 THERAPEUTIC EXERCISES: CPT

## 2021-12-07 PROCEDURE — 97530 THERAPEUTIC ACTIVITIES: CPT

## 2021-12-07 NOTE — PROGRESS NOTES
PT DAILY TREATMENT NOTE - [facility]     Patient Name: Albina Yin  Date:2021  : 1945  [x]  Patient  Verified  Payor: Antione Hinkle / Plan: [place] MED[place] PART [place] & B / Product Type: Medicare /    In QSSY:4082  Out time:930  Total Treatment Time (min): 45  Total Timed Codes (min): 45   1:1 Treatment Time ( [address] only): 39   Visit #: 2 of 24    Treatment Area: Pain in left leg [M79.605]  Pain in left knee [M25.562]    SUBJECTIVE  Pain Level (0-10 scale):5  Any medication changes, allergies to medications, adverse drug reactions, diagnosis change, or new procedure performed?: [x] No    [] Yes (see summary sheet for update)  Subjective functional status/changes:   [] No changes reported    Patient reports no new changes since initial visit. OBJECTIVE    35 min Therapeutic Exercise:  [x] See flow sheet :   Rationale: increase ROM, increase strength and decrease pain to improve the patients ability to complete ADLs    10 min Therapeutic Activity:  [x]  See flow sheet :   Rationale: increase ROM, increase strength and improve coordination  to improve the patients ability to complete ADLs          With   [x] TE   [] TA   [] neuro   [] other: Patient Education: [x] Review HEP    [] Progressed/Changed HEP based on:   [] positioning   [] body mechanics   [] transfers   [] heat/ice application    [] other:      Other Objective/Functional Measures: NA     Pain Level (0-10 scale) post treatment: 3    ASSESSMENT/Changes in Function: Patient responds well to treatment session. Patient required cues to recall exercise parameters. Introduced weight shifts to begin progressive loading to full weight bearing. Reviewed initial HEP and provided handout.  No adverse effects were noted from today's treatment session    Patient will continue to benefit from skilled PT services to modify and progress therapeutic interventions, address functional mobility deficits, address ROM deficits, address strength deficits, analyze and address soft tissue restrictions, analyze and cue movement patterns, analyze and modify body mechanics/ergonomics, assess and modify postural abnormalities, address imbalance and instruct in home and community integration to attain remaining goals. []  See Plan of Care  []  See progress note/recertification  []  See Discharge Summary         Progress towards goals / Updated goals:  Short Term Goals: To be accomplished in 4 weeks:                 Patient will report compliance with HEP at least 1x/day to aid in rehabilitation program.                 Status at IE: Reviewed weight bearing progression                 Current: In-progress, provided initial HEP handout, 12/7/2021                    Patient will demonstrate AROM of 0-115 degrees to aid in completion of ADLs. Status at IE: 3-98 degress                 Current: In-progress, 0-100 degrees, 12/7/2021     Long Term Goals: To be accomplished in 8 weeks:                 Patient will increase strength to 5/5 throughout bilateral LEs to aid in completion of ADLs. Status at IE: 3+/5                 Current:Same as IE                    Patient will report pain less than 1-2/10 average to aid in completion of ADLs. Status at IE:8/10                 Current:Same as IE                    Patient will perform 10 pain free kettle bell squats with 10 lbs with good form/technique to aid in completion of ADLs. Status at IE: can perform sit to stand with UEA                 Current: Same as IE     Patient will improve FOTO to 57 points overall to demonstrate improvement in functional ability.                  Status at IE: FOTO score = 36 (an established functional score where 100 = no disability)                 Current: Same as IE     PLAN  []  Upgrade activities as tolerated     [x]  Continue plan of care  []  Update interventions per flow sheet       []  Discharge due to:_  []  Other:_      Jojo Vee Aneudy Gaytan, SASHA 12/7/2021  8:43 AM    Future Appointments   Date Time Provider Aquilino Tirado   12/7/2021  8:45 AM Parrish Gouge, PT [facility]   12/9/2021  2:30 PM Parrish Gouge, PT [facility]   12/14/2021  1:45 PM Parrish Gouge, PT [facility]   12/16/2021 11:00 AM Calvin Delgado, PT [facility]   12/21/2021  2:30 PM Parrish Gouge, PT [facility]   12/23/2021  3:15 PM Calvin Delgado, PT [facility]   12/28/2021  3:15 PM Parrish Gouge, PT [facility]   12/30/2021  3:15 PM Parrish Gouge, PT [facility]   1/4/2022  1:00 PM Parrish Gouge, PT [facility]   1/6/2022  1:00 PM Parrish Gouge, PT [facility]   1/11/2022  1:00 PM Parrish Gouge, PT [facility]   1/13/2022  1:00 PM Parrish Gouge, PT [facility]   1/18/2022  1:00 PM Parrish Gouge, PT [facility]   1/20/2022  1:00 PM Parrish Gouge, PT [facility]   1/25/2022  1:00 PM Parrish Gouge, PT [facility]   1/27/2022  1:00 PM Parrish Gouge, PT [facility]

## 2021-12-09 ENCOUNTER — HOSPITAL ENCOUNTER (OUTPATIENT)
Dept: PHYSICAL THERAPY | Age: 76
End: 2021-12-09
Payer: MEDICARE

## 2021-12-10 ENCOUNTER — HOSPITAL ENCOUNTER (OUTPATIENT)
Dept: PHYSICAL THERAPY | Age: 76
End: 2021-12-10
Payer: MEDICARE

## 2021-12-14 ENCOUNTER — HOSPITAL ENCOUNTER (OUTPATIENT)
Dept: PHYSICAL THERAPY | Age: 76
Discharge: HOME | End: 2021-12-14
Payer: MEDICARE

## 2021-12-14 PROCEDURE — 97110 THERAPEUTIC EXERCISES: CPT

## 2021-12-14 PROCEDURE — 97530 THERAPEUTIC ACTIVITIES: CPT

## 2021-12-14 NOTE — PROGRESS NOTES
PT DAILY TREATMENT NOTE - [facility]     Patient Name: Ramonita Weber  Date:2021  : 1945  [x]  Patient  Verified  Payor: Juanita Jean / Plan: [place] MEDICARE PART A & B / Product Type: Medicare /    In time:1345  Out time:1430  Total Treatment Time (min): 45  Total Timed Codes (min): 45   1:1 Treatment Time ( [address] only): 39   Visit #: 3 of 24    Treatment Area: Pain in left leg [M79.605]  Pain in left knee [M25.562]    SUBJECTIVE  Pain Level (0-10 scale): 0  Any medication changes, allergies to medications, adverse drug reactions, diagnosis change, or new procedure performed?: [x] No    [] Yes (see summary sheet for update)  Subjective functional status/changes:   [] No changes reported    Patient reports compliance with HEP. States that her knee is stiff but she has less pain. OBJECTIVE    35 min Therapeutic Exercise:  [x] See flow sheet :   Rationale: increase ROM, increase strength and decrease pain to improve the patients ability to complete ADLs    10 min Therapeutic Activity:  [x]  See flow sheet :   Rationale: increase ROM, increase strength and improve coordination  to improve the patients ability to complete ADLs           With   [x] TE   [] TA   [] neuro   [] other: Patient Education: [x] Review HEP    [] Progressed/Changed HEP based on:   [] positioning   [] body mechanics   [] transfers   [] heat/ice application    [] other:      Other Objective/Functional Measures: NA     Pain Level (0-10 scale) post treatment: 0    ASSESSMENT/Changes in Function: Patient responds well to treatment session. Patient demonstrated improved activity and weight bearing tolerance, advanced exercise as a result. She was challenged with exercise prescribed but had no increase in symptoms. She is demonstrating gains in ROM. Will advance exercise as tolerated.   No adverse effects were noted from today's treatment session    Patient will continue to benefit from skilled PT services to modify and progress therapeutic interventions, address functional mobility deficits, address ROM deficits, address strength deficits, analyze and address soft tissue restrictions, analyze and cue movement patterns, analyze and modify body mechanics/ergonomics, assess and modify postural abnormalities, address imbalance and instruct in home and community integration to attain remaining goals. []  See Plan of Care  []  See progress note/recertification  []  See Discharge Summary         Progress towards goals / Updated goals:  Short Term Goals: To be accomplished in 4 weeks:                 [facility] at least 1x/day to aid in rehabilitation program.                 Status at IE: Reviewed weight bearing progression                 Current: In-progress, provided initial HEP handout, 12/7/2021                    Patient will demonstrate AROM of 0-115 degrees to aid in completion of ADLs.                Status at IE: 3-98 degress                 Current: In-progress, 0-100 degrees AROM, 0-105 PROM, 12/14/2021     Long Term Goals: To be accomplished in 8 weeks:                 RVBGUMW will increase strength to 5/5 throughout bilateral LEs to aid in completion of ADLs.                Status at IE: 3+/5                 Current:Same as IE                    Patient will report pain less than 1-2/10 average to aid in completion of ADLs.                Status at IE:8/10                 Current:Same as IE                    Patient will perform 10 pain free kettle bell squats with 10 lbs with good form/technique to aid in completion of ADLs.                Status at IE: can perform sit to stand with UEA                 Current: Same as IE     Patient will improve FOTO to 57 points overall to demonstrate improvement in functional ability.                  Status at IE: FOTO score = 36 (an established functional score where 100 = no disability)                 Current: Same as IE    PLAN  []  Upgrade activities as tolerated [x]  Continue plan of care  []  Update interventions per flow sheet       []  Discharge due to:_  []  Other:_      John Hurst, PT, DPT 12/14/2021  2:03 PM    Future Appointments   Date Time Provider Aquilino Tirado   12/16/2021 11:00 AM Vipul Delgado, PT [facility]   12/21/2021  2:30 PM Sherrine Hymen, PT [facility]   12/23/2021  3:15 PM Vipul Delgado, PT [facility]   12/28/2021  3:15 PM Sherrine Hymen, PT [facility]   12/30/2021  3:15 PM Sherrine Hymen, PT [facility]   1/4/2022  1:00 PM Sherrine Hymen, PT [facility]   1/6/2022  1:00 PM Sherrine Hymen, PT [facility]   1/11/2022  1:00 PM Sherrine Hymen, PT [facility]   1/13/2022  1:00 PM Sherrine Hymen, PT [facility]   1/18/2022  1:00 PM Sherrine Hymen, PT [facility]   1/20/2022  1:00 PM Sherrine Hymen, PT [facility]   1/25/2022  1:00 PM Sherrine Hymen, PT [facility]   1/27/2022  1:00 PM Sherrine Hymen, PT [facility]

## 2021-12-16 ENCOUNTER — APPOINTMENT (OUTPATIENT)
Dept: PHYSICAL THERAPY | Age: 76
End: 2021-12-16
Payer: MEDICARE

## 2021-12-21 ENCOUNTER — APPOINTMENT (OUTPATIENT)
Dept: PHYSICAL THERAPY | Age: 76
End: 2021-12-21
Payer: MEDICARE

## 2021-12-21 ENCOUNTER — TELEPHONE (OUTPATIENT)
Dept: PHYSICAL THERAPY | Age: 76
End: 2021-12-21

## 2021-12-23 ENCOUNTER — APPOINTMENT (OUTPATIENT)
Dept: PHYSICAL THERAPY | Age: 76
End: 2021-12-23
Payer: MEDICARE

## 2021-12-28 ENCOUNTER — APPOINTMENT (OUTPATIENT)
Dept: PHYSICAL THERAPY | Age: 76
End: 2021-12-28
Payer: MEDICARE

## 2021-12-30 ENCOUNTER — HOSPITAL ENCOUNTER (OUTPATIENT)
Dept: PHYSICAL THERAPY | Age: 76
Discharge: HOME | End: 2021-12-30
Payer: MEDICARE

## 2021-12-30 PROCEDURE — 97530 THERAPEUTIC ACTIVITIES: CPT

## 2021-12-30 PROCEDURE — 97110 THERAPEUTIC EXERCISES: CPT

## 2021-12-30 NOTE — PROGRESS NOTES
PT DAILY TREATMENT NOTE - [facility]     Patient Name: Blanca Paul  Date:2021  : 1945  [x]  Patient  Verified  Payor: Naseem Nayak / Plan: [place] MEDICARE PART A & B / Product Type: Medicare /    In time:1520  Out time:1600  Total Treatment Time (min): 40  Total Timed Codes (min): 40   1:1 Treatment Time ( [address] only): 40   Visit #: 4 of 24    Treatment Area: Pain in left leg [M79.605]    SUBJECTIVE  Pain Level (0-10 scale): 0  Any medication changes, allergies to medications, adverse drug reactions, diagnosis change, or new procedure performed?: [x] No    [] Yes (see summary sheet for update)  Subjective functional status/changes:   [] No changes reported  Patient reports that she is compliant with HEP. She reports that she is having less pain but is still stiff and sore. OBJECTIVE    30 min Therapeutic Exercise:  [x] See flow sheet :   Rationale: increase ROM, increase strength and decrease pain to improve the patients ability to complete ADLs    10 min Therapeutic Activity:  [x]  See flow sheet :   Rationale: increase ROM, increase strength and improve coordination  to improve the patients ability to complete ADLs          With   [x] TE   [] TA   [] neuro   [] other: Patient Education: [x] Review HEP    [] Progressed/Changed HEP based on:   [] positioning   [] body mechanics   [] transfers   [] heat/ice application    [] other:      Other Objective/Functional Measures: NA     Pain Level (0-10 scale) post treatment: 0    ASSESSMENT/Changes in Function: Patient responds well to treatment session. Patient demonstrated improved activity tolerance. Advanced resistance as a result. She was challenged with exercise prescribed. Assessed patient's ROM and she has gained 5 additional degrees of knee flexion. Will reassess patient next visit.  No adverse effects were noted from today's treatment session      Patient will continue to benefit from skilled PT services to modify and progress therapeutic interventions, address functional mobility deficits, address ROM deficits, address strength deficits, analyze and address soft tissue restrictions, analyze and cue movement patterns, analyze and modify body mechanics/ergonomics, assess and modify postural abnormalities, address imbalance and instruct in home and community integration to attain remaining goals. []  See Plan of Care  []  See progress note/recertification  []  See Discharge Summary         Progress towards goals / Updated goals:  Short Term Goals: To be accomplished in 4 weeks:                 MLLYZYR will report compliance with HEP at least 1x/day to aid in rehabilitation program.                 Status at IE: Reviewed weight bearing progression                 Current:Met, 12/30/2021                    Patient will demonstrate AROM of 0-115 degrees to aid in completion of ADLs.                Status at IE: 3-98 degress                 Current: In-progress, 0-110 degrees, 12/30/2021     Long Term Goals: To be accomplished in 8 weeks:                 CCXVFVR will increase strength to 5/5 throughout bilateral LEs to aid in completion of ADLs.                Status at IE: 3+/5                 Current:Same as IE                    Patient will report pain less than 1-2/10 average to aid in completion of ADLs.                Status at IE:8/10                 Current:Same as IE                    Patient will perform 10 pain free kettle bell squats with 10 lbs with good form/technique to aid in completion of ADLs.                Status at IE: can perform sit to stand with UEA                 Current: Same as IE     Patient will improve FOTO to 57 points overall to demonstrate improvement in functional ability.                  Status at IE: FOTO score = 36 (an established functional score where 100 = no disability)                 Current: Same as IE    PLAN  []  Upgrade activities as tolerated     [x]  Continue plan of care  []  Update interventions per flow sheet       []  Discharge due to:_  []  Other:_      Enid Alpers, PT, DPT 12/30/2021  3:43 PM    Future Appointments   Date Time Provider Aquilino Tirado   1/4/2022  1:00 PM Jovany Hernandez, PT [facility]   1/6/2022  1:00 PM Jovany Hernandez, PT [facility]   1/11/2022  1:00 PM Jovany Hernandez, PT [facility]   1/13/2022  1:00 PM Jovany Hernandez, PT [facility]   1/18/2022  1:00 PM Jovany Hernandez, PT [facility]   1/20/2022  1:00 PM Jovany Hernandez, PT [facility]   1/25/2022  1:00 PM Jovany Hernandez, PT [facility]   1/27/2022  1:00 PM Jovany Hernandez, PT [facility]

## 2022-01-04 ENCOUNTER — APPOINTMENT (OUTPATIENT)
Dept: PHYSICAL THERAPY | Age: 77
End: 2022-01-04

## 2022-01-06 ENCOUNTER — APPOINTMENT (OUTPATIENT)
Dept: PHYSICAL THERAPY | Age: 77
End: 2022-01-06

## 2022-01-11 ENCOUNTER — APPOINTMENT (OUTPATIENT)
Dept: PHYSICAL THERAPY | Age: 77
End: 2022-01-11

## 2022-01-13 ENCOUNTER — APPOINTMENT (OUTPATIENT)
Dept: PHYSICAL THERAPY | Age: 77
End: 2022-01-13

## 2022-01-18 ENCOUNTER — APPOINTMENT (OUTPATIENT)
Dept: PHYSICAL THERAPY | Age: 77
End: 2022-01-18

## 2022-01-20 ENCOUNTER — APPOINTMENT (OUTPATIENT)
Dept: PHYSICAL THERAPY | Age: 77
End: 2022-01-20

## 2022-01-25 ENCOUNTER — APPOINTMENT (OUTPATIENT)
Dept: PHYSICAL THERAPY | Age: 77
End: 2022-01-25

## 2022-02-02 ENCOUNTER — TELEPHONE (OUTPATIENT)
Dept: PHYSICAL THERAPY | Age: 77
End: 2022-02-02

## 2022-02-02 NOTE — PROGRESS NOTES
[facility] at [address] [place] [address] Drive: [phone]   Fax: [phone]  Discharge Summary  Patient Name: Rachel Walters :    Medical   Diagnosis: Pain in left leg [M79.605] Treatment Diagnosis: Left knee pain  Left leg pain   Onset Date: 2021     Referral Source: Shital Johnson [facility]): 2022   Prior Hospitalization: See medical history Provider #: [id number]   Prior Level of Function: House Keeping, cooking   Comorbidities: Thyroidism, osteopenia, Osteoarthritis, High blood pressure, hx of breast cancer   Medications: Verified on Patient Summary List      ===========================================================================================  Assessment / Summary of Care:  Rachel Walters is a 77 y.o.  female with left knee pain and decreased ROM s/p left total knee arthroplasty revision and internal fixation of femur performed 2021 secondary to fracture from trip and fall accident 2021. Patient has not returned to clinic in 30 days due to change in medical status. Unable to perform formal assessment on patient as a result.     ===========================================================================================    Plan: Discharge to [facility].     Goals/Progress Towards Goals: Unable to reassess     ===========================================================================================  Subjective: NA      Objective: NA    Therapist Signature: Mary Zamora PT, DPT Date: 2022     Time: 3:02 PM

## 2024-08-19 NOTE — PROGRESS NOTES
PT DAILY TREATMENT NOTE/KNEE EVAL 10-18    Patient Name: Osbaldo Leavitt  Date:2021  : 1945  [x]  Patient  Verified  Payor: Luis M Dick / Plan: [place] MEDICARE PART A & B / Product Type: Medicare /    In time:1345  Out time:1430  Total Treatment Time (min): 45  Visit #: 1 of 24    Medicare/BCBS Only   Total Timed Codes (min):  0 1:1 Treatment Time:  45     Treatment Area: Pain in left leg [M79.605]    SUBJECTIVE  Pain Level (0-10 scale): 8  []constant []intermittent []improving []worsening []no change since onset    Any medication changes, allergies to medications, adverse drug reactions, diagnosis change, or new procedure performed?: [x] No    [] Yes (see summary sheet for update)  Subjective functional status/changes:     Patient presents with c/o right knee pain and decreased ROM s/p left total knee arthroplasty revision and internal fixation of femur performed 2021 secondary to fracture from trip and fall accident 2021. Patient describes pain as sharp stabbing. Denies numbness/tingling. Denies popping/clicking. Aggravating factors:weight bearing, walking, standing, sleeping. Alleviating factors: rest, sleeping on her side. Denies red flags: SOB, chest pain, dizziness/lightheadedness, blurred/double vision, HA, chills/fevers, night sweats, change in bowel/bladder control, abdominal pain, difficulty swallowing, slurred speech, unexplained weight gain/loss, nausea, vomiting. PMHx: Thyroidism, osteopenia, Osteoarthritis, High blood pressure, hx of breast cancer. Surgical Hx: Right TKA, Left TKA, Mastectomy on right. Social Hx: Lives with  and grandson in two story home, occasional alcohol, work status: retired. PLOF: [employer], cooking.  CLOF: Progressive weight bearing    OBJECTIVE/EXAMINATION    45 min [x]Eval                  []Re-Eval          With   [x] TE   [] TA   [] neuro   [] other: Patient Education: [x] Review HEP    [] Progressed/Changed HEP based on:   [] positioning   [] body mechanics   [] transfers   [] heat/ice application    [] other:        Physical Therapy Evaluation - Knee    Posture:   Gait:  [] Normal    [] Abnormal    [x] Antalgic    [] NWB    Device: 2WW    Describe:Patient ambulates with modified independence utilizing 2 wheel walker with toe touch weight bearing on the left. ROM / Strength  [] Unable to assess                  AROM                 Strength (1-5)    Left Right Left Right   Hip Flexion   4- 4+    Extension   3+ 4+    Abduction   3+ 4    Adduction   4- 4+   Knee Flexion 98  3+ 4+    Extension 3  3+ 5   Ankle Plantarflexion   3 4    Dorsiflexion   4- 4+     Palpation:   Neg/Pos  Neg/Pos  Neg/Pos   Joint Line Pos Quad tendon Neg Patellar ligament Pos   Patella Pos Fibular head Neg Pes Anserinus Neg   Tibial tubercle Pos Hamstring tendons Neg Infrapatellar fat pad Neg     Optional Tests:  Patellar Mobility   []L []R Hypermobile [x]L []R Hypomobile         Girth Measurements:     Cm at joint line   Left 38   Right  37     Other tests/comments:  TUG 18 seconds with walker  Surgical wound is healing well no signs of infection     Pain level (0-10 scale) post treatment: 8    ASSESSMENT/Changes in Function:   Patient presents with c/o right knee pain and decreased ROM s/p left total knee arthroplasty revision and internal fixation of femur performed 8/31/2021 secondary to fracture from trip and fall accident 8/30/2021. Patient has reduced left knee ROM. She has decreased strength in bilateral LEs left ore than right. She has reduced balance. Patient ambulates with modified independence utilizing 2 wheel walker with toe touch weight bearing on the left. Patient presentation is consistent s/p femoral internal fixation with revision of total knee arthroplasty secondary to fracture.  Patient will benefit from skilled PT services to modify and progress therapeutic interventions, address functional mobility deficits, address ROM deficits, address strength deficits, analyze and address soft tissue restrictions, analyze and cue movement patterns, analyze and modify body mechanics/ergonomics and assess and modify postural abnormalities to attain her goals.      []  See Plan of Care  []  See progress note/recertification  []  See Discharge Summary         Progress towards goals / Updated goals:  See POC    PLAN  []  Upgrade activities as tolerated     [x]  Continue plan of care  []  Update interventions per flow sheet       []  Discharge due to:_  []  Other:_      Mouna Rosa PT, DPT 12/1/2021  1:52 PM General Sunscreen Counseling: I recommended a broad spectrum sunscreen with a SPF of 30 or higher.  I explained that SPF 30 sunscreens block approximately 97 percent of the sun's harmful rays.  Sunscreens should be applied at least 15 minutes prior to expected sun exposure and then every 2 hours after that as long as sun exposure continues. If swimming or exercising sunscreen should be reapplied every 45 minutes to an hour after getting wet or sweating.  One ounce, or the equivalent of a shot glass full of sunscreen, is adequate to protect the skin not covered by a bathing suit. I also recommended a lip balm with a sunscreen as well. Sun protective clothing can be used in lieu of sunscreen but must be worn the entire time you are exposed to the sun's rays. Detail Level: Generalized